# Patient Record
Sex: FEMALE | Race: WHITE | Employment: UNEMPLOYED | URBAN - METROPOLITAN AREA
[De-identification: names, ages, dates, MRNs, and addresses within clinical notes are randomized per-mention and may not be internally consistent; named-entity substitution may affect disease eponyms.]

---

## 2018-03-12 ENCOUNTER — TELEPHONE (OUTPATIENT)
Dept: ENDOCRINOLOGY | Age: 58
End: 2018-03-12

## 2018-03-12 DIAGNOSIS — D35.2 PITUITARY ADENOMA (HCC): Primary | ICD-10-CM

## 2018-03-12 DIAGNOSIS — E11.9 TYPE 2 DIABETES MELLITUS WITHOUT COMPLICATION, WITHOUT LONG-TERM CURRENT USE OF INSULIN (HCC): ICD-10-CM

## 2018-04-02 DIAGNOSIS — D35.2 PITUITARY ADENOMA (HCC): ICD-10-CM

## 2018-04-02 DIAGNOSIS — E11.9 TYPE 2 DIABETES MELLITUS WITHOUT COMPLICATION, WITHOUT LONG-TERM CURRENT USE OF INSULIN (HCC): ICD-10-CM

## 2018-04-02 LAB
A/G RATIO: 1.7 (ref 1.1–2.2)
ALBUMIN SERPL-MCNC: 4.4 G/DL (ref 3.4–5)
ALP BLD-CCNC: 63 U/L (ref 40–129)
ALT SERPL-CCNC: 59 U/L (ref 10–40)
ANION GAP SERPL CALCULATED.3IONS-SCNC: 15 MMOL/L (ref 3–16)
AST SERPL-CCNC: 45 U/L (ref 15–37)
BILIRUB SERPL-MCNC: 0.4 MG/DL (ref 0–1)
BUN BLDV-MCNC: 11 MG/DL (ref 7–20)
CALCIUM SERPL-MCNC: 9.1 MG/DL (ref 8.3–10.6)
CHLORIDE BLD-SCNC: 99 MMOL/L (ref 99–110)
CHOLESTEROL, TOTAL: 158 MG/DL (ref 0–199)
CO2: 26 MMOL/L (ref 21–32)
CREAT SERPL-MCNC: 0.6 MG/DL (ref 0.6–1.1)
CREATININE URINE: 71.1 MG/DL (ref 28–259)
GFR AFRICAN AMERICAN: >60
GFR NON-AFRICAN AMERICAN: >60
GLOBULIN: 2.6 G/DL
GLUCOSE BLD-MCNC: 124 MG/DL (ref 70–99)
HDLC SERPL-MCNC: 40 MG/DL (ref 40–60)
LDL CHOLESTEROL CALCULATED: 79 MG/DL
MICROALBUMIN UR-MCNC: <1.2 MG/DL
MICROALBUMIN/CREAT UR-RTO: NORMAL MG/G (ref 0–30)
POTASSIUM SERPL-SCNC: 4.6 MMOL/L (ref 3.5–5.1)
PROLACTIN: 26.8 NG/ML
SODIUM BLD-SCNC: 140 MMOL/L (ref 136–145)
TOTAL PROTEIN: 7 G/DL (ref 6.4–8.2)
TRIGL SERPL-MCNC: 195 MG/DL (ref 0–150)
VLDLC SERPL CALC-MCNC: 39 MG/DL

## 2018-04-03 LAB
ESTIMATED AVERAGE GLUCOSE: 125.5 MG/DL
HBA1C MFR BLD: 6 %
TSH, 3RD GENERATION: 3.71 MU/L (ref 0.3–4)

## 2018-04-05 LAB
VITAMIN D2 AND D3, TOTAL: 42.3 NG/ML (ref 30–80)
VITAMIN D2, 25 HYDROXY: <1 NG/ML
VITAMIN D3,25 HYDROXY: 42.3 NG/ML

## 2018-04-11 ENCOUNTER — OFFICE VISIT (OUTPATIENT)
Dept: ENDOCRINOLOGY | Age: 58
End: 2018-04-11

## 2018-04-11 VITALS
HEART RATE: 68 BPM | BODY MASS INDEX: 41.65 KG/M2 | WEIGHT: 265.4 LBS | OXYGEN SATURATION: 99 % | SYSTOLIC BLOOD PRESSURE: 130 MMHG | DIASTOLIC BLOOD PRESSURE: 84 MMHG | HEIGHT: 67 IN

## 2018-04-11 DIAGNOSIS — E22.1 HYPERPROLACTINEMIA (HCC): Primary | ICD-10-CM

## 2018-04-11 PROCEDURE — 99215 OFFICE O/P EST HI 40 MIN: CPT | Performed by: INTERNAL MEDICINE

## 2018-04-11 RX ORDER — CABERGOLINE 0.5 MG/1
0.25 TABLET ORAL
Qty: 8 TABLET | Refills: 3 | Status: SHIPPED | OUTPATIENT
Start: 2018-04-12 | End: 2018-05-07 | Stop reason: SDUPTHER

## 2018-04-11 RX ORDER — CHOLECALCIFEROL (VITAMIN D3) 125 MCG
1 CAPSULE ORAL 2 TIMES DAILY
COMMUNITY

## 2018-04-11 RX ORDER — BACLOFEN 10 MG/1
10 TABLET ORAL 4 TIMES DAILY
COMMUNITY

## 2018-04-11 ASSESSMENT — PATIENT HEALTH QUESTIONNAIRE - PHQ9
SUM OF ALL RESPONSES TO PHQ9 QUESTIONS 1 & 2: 0
2. FEELING DOWN, DEPRESSED OR HOPELESS: 0
1. LITTLE INTEREST OR PLEASURE IN DOING THINGS: 0
SUM OF ALL RESPONSES TO PHQ QUESTIONS 1-9: 0

## 2018-05-07 ENCOUNTER — TELEPHONE (OUTPATIENT)
Dept: ENDOCRINOLOGY | Age: 58
End: 2018-05-07

## 2018-05-07 DIAGNOSIS — E22.1 HYPERPROLACTINEMIA (HCC): ICD-10-CM

## 2018-05-07 RX ORDER — CABERGOLINE 0.5 MG/1
0.5 TABLET ORAL
Qty: 8 TABLET | Refills: 3 | Status: SHIPPED | OUTPATIENT
Start: 2018-05-07 | End: 2018-09-23 | Stop reason: SDUPTHER

## 2018-07-25 DIAGNOSIS — E22.1 HYPERPROLACTINEMIA (HCC): ICD-10-CM

## 2018-07-25 LAB
ESTRADIOL LEVEL: <5 PG/ML
FOLLICLE STIMULATING HORMONE: 48.3 MIU/ML
LUTEINIZING HORMONE: 36.6 MIU/ML
PROLACTIN: 20 NG/ML
T3 TOTAL: 1.32 NG/ML (ref 0.8–2)
T4 FREE: 1.2 NG/DL (ref 0.9–1.8)
TSH SERPL DL<=0.05 MIU/L-ACNC: 4.36 UIU/ML (ref 0.27–4.2)

## 2018-09-23 DIAGNOSIS — E22.1 HYPERPROLACTINEMIA (HCC): ICD-10-CM

## 2018-09-24 RX ORDER — CABERGOLINE 0.5 MG/1
TABLET ORAL
Qty: 8 TABLET | Refills: 2 | Status: SHIPPED | OUTPATIENT
Start: 2018-09-24 | End: 2019-01-14 | Stop reason: SDUPTHER

## 2018-10-01 ENCOUNTER — TELEPHONE (OUTPATIENT)
Dept: ENDOCRINOLOGY | Age: 58
End: 2018-10-01

## 2018-10-01 DIAGNOSIS — D35.2 PITUITARY ADENOMA (HCC): ICD-10-CM

## 2018-10-01 NOTE — TELEPHONE ENCOUNTER
Prolactin is only lab order in chart, pt would like to know if you want any additional labs done, she said usually she gets an A1C also. She continues to have heavy bleeding @ 63, wants to know if you want to check estrogen or hormones.  She plans to go to [facility] [address]

## 2018-10-02 DIAGNOSIS — E22.1 HYPERPROLACTINEMIA (HCC): ICD-10-CM

## 2018-10-02 DIAGNOSIS — D35.2 PITUITARY ADENOMA (HCC): ICD-10-CM

## 2018-10-02 LAB
CREATININE URINE: 154.6 MG/DL (ref 28–259)
ESTRADIOL LEVEL: 64 PG/ML
FOLLICLE STIMULATING HORMONE: 6.7 MIU/ML
LUTEINIZING HORMONE: 5.4 MIU/ML
MICROALBUMIN UR-MCNC: <1.2 MG/DL
MICROALBUMIN/CREAT UR-RTO: NORMAL MG/G (ref 0–30)
PROLACTIN: 26.3 NG/ML
TSH SERPL DL<=0.05 MIU/L-ACNC: 3.08 UIU/ML (ref 0.27–4.2)

## 2018-10-03 ENCOUNTER — TELEPHONE (OUTPATIENT)
Dept: ENDOCRINOLOGY | Age: 58
End: 2018-10-03

## 2018-10-03 LAB
ESTIMATED AVERAGE GLUCOSE: 119.8 MG/DL
HBA1C MFR BLD: 5.8 %

## 2018-10-11 ENCOUNTER — OFFICE VISIT (OUTPATIENT)
Dept: ENDOCRINOLOGY | Age: 58
End: 2018-10-11
Payer: COMMERCIAL

## 2018-10-11 VITALS
OXYGEN SATURATION: 98 % | DIASTOLIC BLOOD PRESSURE: 72 MMHG | BODY MASS INDEX: 40.34 KG/M2 | SYSTOLIC BLOOD PRESSURE: 118 MMHG | HEIGHT: 67 IN | WEIGHT: 257 LBS | HEART RATE: 64 BPM

## 2018-10-11 DIAGNOSIS — D35.2 PITUITARY MICROADENOMA (HCC): Primary | ICD-10-CM

## 2018-10-11 DIAGNOSIS — R73.03 PREDIABETES: ICD-10-CM

## 2018-10-11 DIAGNOSIS — E66.9 OBESITY (BMI 35.0-39.9 WITHOUT COMORBIDITY): ICD-10-CM

## 2018-10-11 DIAGNOSIS — E78.2 MIXED HYPERLIPIDEMIA: ICD-10-CM

## 2018-10-11 PROCEDURE — 99214 OFFICE O/P EST MOD 30 MIN: CPT | Performed by: INTERNAL MEDICINE

## 2018-10-11 RX ORDER — IBUPROFEN 600 MG/1
600 TABLET ORAL EVERY 6 HOURS PRN
COMMUNITY
End: 2019-04-11
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
## 2019-01-14 DIAGNOSIS — E22.1 HYPERPROLACTINEMIA (HCC): ICD-10-CM

## 2019-01-14 RX ORDER — CABERGOLINE 0.5 MG/1
TABLET ORAL
Qty: 8 TABLET | Refills: 1 | Status: SHIPPED | OUTPATIENT
Start: 2019-01-14 | End: 2019-03-21 | Stop reason: SDUPTHER

## 2019-03-21 DIAGNOSIS — E22.1 HYPERPROLACTINEMIA (HCC): ICD-10-CM

## 2019-03-22 RX ORDER — CABERGOLINE 0.5 MG/1
TABLET ORAL
Qty: 8 TABLET | Refills: 0 | Status: SHIPPED | OUTPATIENT
Start: 2019-03-22 | End: 2019-04-11 | Stop reason: SDUPTHER

## 2019-04-08 ENCOUNTER — TELEPHONE (OUTPATIENT)
Dept: ENDOCRINOLOGY | Age: 59
End: 2019-04-08

## 2019-04-08 NOTE — TELEPHONE ENCOUNTER
Please advise patient that I have ordered the labs that would be done after her hysterectomy already

## 2019-04-08 NOTE — TELEPHONE ENCOUNTER
Pt called coming in on Thurs for an appt and wants to know if Dr River Ramirez should order additional labs since she had a hysterectomy and salpingo-oophorectomy?  She had the surgery back in Oct 2018

## 2019-04-10 DIAGNOSIS — D35.2 PITUITARY MICROADENOMA (HCC): ICD-10-CM

## 2019-04-10 DIAGNOSIS — E78.2 MIXED HYPERLIPIDEMIA: ICD-10-CM

## 2019-04-10 DIAGNOSIS — E66.9 OBESITY (BMI 35.0-39.9 WITHOUT COMORBIDITY): ICD-10-CM

## 2019-04-10 DIAGNOSIS — R73.03 PREDIABETES: ICD-10-CM

## 2019-04-10 LAB
A/G RATIO: 1.5 (ref 1.1–2.2)
ALBUMIN SERPL-MCNC: 4.7 G/DL (ref 3.4–5)
ALP BLD-CCNC: 92 U/L (ref 40–129)
ALT SERPL-CCNC: 43 U/L (ref 10–40)
ANION GAP SERPL CALCULATED.3IONS-SCNC: 18 MMOL/L (ref 3–16)
AST SERPL-CCNC: 41 U/L (ref 15–37)
BILIRUB SERPL-MCNC: 0.7 MG/DL (ref 0–1)
BUN BLDV-MCNC: 11 MG/DL (ref 7–20)
CALCIUM SERPL-MCNC: 9.6 MG/DL (ref 8.3–10.6)
CHLORIDE BLD-SCNC: 101 MMOL/L (ref 99–110)
CHOLESTEROL, TOTAL: 167 MG/DL (ref 0–199)
CO2: 24 MMOL/L (ref 21–32)
CREAT SERPL-MCNC: 0.6 MG/DL (ref 0.6–1.1)
ESTIMATED AVERAGE GLUCOSE: 137 MG/DL
FOLLICLE STIMULATING HORMONE: 69.1 MIU/ML
GFR AFRICAN AMERICAN: >60
GFR NON-AFRICAN AMERICAN: >60
GLOBULIN: 3.1 G/DL
GLUCOSE BLD-MCNC: 122 MG/DL (ref 70–99)
HBA1C MFR BLD: 6.4 %
HDLC SERPL-MCNC: 44 MG/DL (ref 40–60)
LDL CHOLESTEROL CALCULATED: 88 MG/DL
LUTEINIZING HORMONE: 52.5 MIU/ML
POTASSIUM SERPL-SCNC: 4.3 MMOL/L (ref 3.5–5.1)
PROLACTIN: 13.9 NG/ML
SODIUM BLD-SCNC: 143 MMOL/L (ref 136–145)
T3 TOTAL: 1.54 NG/ML (ref 0.8–2)
T4 FREE: 1.1 NG/DL (ref 0.9–1.8)
TOTAL PROTEIN: 7.8 G/DL (ref 6.4–8.2)
TRIGL SERPL-MCNC: 173 MG/DL (ref 0–150)
TSH SERPL DL<=0.05 MIU/L-ACNC: 3.31 UIU/ML (ref 0.27–4.2)
VITAMIN D 25-HYDROXY: 49.7 NG/ML
VLDLC SERPL CALC-MCNC: 35 MG/DL

## 2019-04-11 ENCOUNTER — OFFICE VISIT (OUTPATIENT)
Dept: ENDOCRINOLOGY | Age: 59
End: 2019-04-11
Payer: COMMERCIAL

## 2019-04-11 VITALS
WEIGHT: 252 LBS | SYSTOLIC BLOOD PRESSURE: 132 MMHG | HEART RATE: 91 BPM | HEIGHT: 67 IN | DIASTOLIC BLOOD PRESSURE: 70 MMHG | OXYGEN SATURATION: 98 % | BODY MASS INDEX: 39.55 KG/M2

## 2019-04-11 DIAGNOSIS — E11.9 TYPE 2 DIABETES MELLITUS WITHOUT COMPLICATION, WITHOUT LONG-TERM CURRENT USE OF INSULIN (HCC): ICD-10-CM

## 2019-04-11 DIAGNOSIS — D35.2 PITUITARY MICROADENOMA (HCC): ICD-10-CM

## 2019-04-11 DIAGNOSIS — E22.1 HYPERPROLACTINEMIA (HCC): Primary | ICD-10-CM

## 2019-04-11 PROCEDURE — 99214 OFFICE O/P EST MOD 30 MIN: CPT | Performed by: INTERNAL MEDICINE

## 2019-04-11 RX ORDER — CABERGOLINE 0.5 MG/1
TABLET ORAL
Qty: 8 TABLET | Refills: 5 | Status: SHIPPED | OUTPATIENT
Start: 2019-04-11 | End: 2019-07-10 | Stop reason: SDUPTHER
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
## 2019-07-05 ENCOUNTER — TELEPHONE (OUTPATIENT)
Dept: ENDOCRINOLOGY | Age: 59
End: 2019-07-05

## 2019-07-05 DIAGNOSIS — E11.9 TYPE 2 DIABETES MELLITUS WITHOUT COMPLICATION, WITHOUT LONG-TERM CURRENT USE OF INSULIN (HCC): ICD-10-CM

## 2019-07-05 DIAGNOSIS — M81.0 OSTEOPOROSIS, UNSPECIFIED OSTEOPOROSIS TYPE, UNSPECIFIED PATHOLOGICAL FRACTURE PRESENCE: Primary | ICD-10-CM

## 2019-07-05 DIAGNOSIS — E22.1 HYPERPROLACTINEMIA (HCC): ICD-10-CM

## 2019-07-05 DIAGNOSIS — D35.2 PITUITARY MICROADENOMA (HCC): ICD-10-CM

## 2019-07-05 LAB
CREATININE URINE: 68.4 MG/DL (ref 28–259)
MICROALBUMIN UR-MCNC: <1.2 MG/DL
MICROALBUMIN/CREAT UR-RTO: NORMAL MG/G (ref 0–30)

## 2019-07-05 NOTE — TELEPHONE ENCOUNTER
PT states she is to have a Dexa Scan but [facility] states that her order is not in the system, they need the order faxed to [phone]

## 2019-07-06 LAB
A/G RATIO: 1.9 (ref 1.1–2.2)
ALBUMIN SERPL-MCNC: 4.8 G/DL (ref 3.4–5)
ALP BLD-CCNC: 81 U/L (ref 40–129)
ALT SERPL-CCNC: 39 U/L (ref 10–40)
ANION GAP SERPL CALCULATED.3IONS-SCNC: 12 MMOL/L (ref 3–16)
AST SERPL-CCNC: 36 U/L (ref 15–37)
BILIRUB SERPL-MCNC: 0.5 MG/DL (ref 0–1)
BUN BLDV-MCNC: 12 MG/DL (ref 7–20)
CALCIUM SERPL-MCNC: 9.8 MG/DL (ref 8.3–10.6)
CHLORIDE BLD-SCNC: 101 MMOL/L (ref 99–110)
CHOLESTEROL, TOTAL: 157 MG/DL (ref 0–199)
CO2: 28 MMOL/L (ref 21–32)
CREAT SERPL-MCNC: 0.6 MG/DL (ref 0.6–1.1)
ESTIMATED AVERAGE GLUCOSE: 128.4 MG/DL
ESTRADIOL LEVEL: <5 PG/ML
FOLLICLE STIMULATING HORMONE: 61.9 MIU/ML
GFR AFRICAN AMERICAN: >60
GFR NON-AFRICAN AMERICAN: >60
GLOBULIN: 2.5 G/DL
GLUCOSE BLD-MCNC: 111 MG/DL (ref 70–99)
HBA1C MFR BLD: 6.1 %
HDLC SERPL-MCNC: 41 MG/DL (ref 40–60)
LDL CHOLESTEROL CALCULATED: 78 MG/DL
PARATHYROID HORMONE INTACT: 22.2 PG/ML (ref 14–72)
POTASSIUM SERPL-SCNC: 4.4 MMOL/L (ref 3.5–5.1)
PROLACTIN: 13.8 NG/ML
SODIUM BLD-SCNC: 141 MMOL/L (ref 136–145)
T4 FREE: 1.2 NG/DL (ref 0.9–1.8)
TOTAL PROTEIN: 7.3 G/DL (ref 6.4–8.2)
TRIGL SERPL-MCNC: 190 MG/DL (ref 0–150)
TSH SERPL DL<=0.05 MIU/L-ACNC: 3.17 UIU/ML (ref 0.27–4.2)
VLDLC SERPL CALC-MCNC: 38 MG/DL

## 2019-07-07 LAB
T3 TOTAL: 1.32 NG/ML (ref 0.8–2)
VITAMIN D 25-HYDROXY: 49.3 NG/ML

## 2019-07-08 ENCOUNTER — HOSPITAL ENCOUNTER (OUTPATIENT)
Dept: GENERAL RADIOLOGY | Age: 59
Discharge: HOME | End: 2019-07-08
Payer: COMMERCIAL

## 2019-07-08 DIAGNOSIS — M81.0 OSTEOPOROSIS, UNSPECIFIED OSTEOPOROSIS TYPE, UNSPECIFIED PATHOLOGICAL FRACTURE PRESENCE: ICD-10-CM

## 2019-07-08 PROCEDURE — 77080 DXA BONE DENSITY AXIAL: CPT

## 2019-07-10 ENCOUNTER — OFFICE VISIT (OUTPATIENT)
Dept: ENDOCRINOLOGY | Age: 59
End: 2019-07-10
Payer: COMMERCIAL

## 2019-07-10 VITALS
DIASTOLIC BLOOD PRESSURE: 72 MMHG | HEIGHT: 67 IN | HEART RATE: 78 BPM | OXYGEN SATURATION: 98 % | SYSTOLIC BLOOD PRESSURE: 130 MMHG | WEIGHT: 251 LBS | BODY MASS INDEX: 39.39 KG/M2

## 2019-07-10 DIAGNOSIS — E22.1 HYPERPROLACTINEMIA (HCC): Primary | ICD-10-CM

## 2019-07-10 DIAGNOSIS — J30.1 SEASONAL ALLERGIC RHINITIS DUE TO POLLEN: ICD-10-CM

## 2019-07-10 PROCEDURE — 99214 OFFICE O/P EST MOD 30 MIN: CPT | Performed by: INTERNAL MEDICINE

## 2019-07-10 RX ORDER — CABERGOLINE 0.5 MG/1
TABLET ORAL
Qty: 8 TABLET | Refills: 5 | Status: SHIPPED | OUTPATIENT
Start: 2019-07-10 | End: 2020-05-06 | Stop reason: SDUPTHER

## 2019-07-10 NOTE — PROGRESS NOTES
abdomen    Cyst of fallopian tube 2018    bilat     H/O bronchitis     Pituitary microadenoma (HCC)     Pituitary microadenoma ([address] 10/11/2018    Prediabetes     Sleep apnea     PT DOES NOT USE HER CPAP MACHINE    Torn meniscus 2018    L      Patient Active Problem List    Diagnosis Date Noted    Pituitary microadenoma ([address] 10/11/2018    Left breast mass 10/21/2015    Hearing loss 05/07/2014    Allergic rhinitis, seasonal 05/07/2014    Sialadenitis 12/10/2013    Low back pain 04/29/2013    Neck pain 04/29/2013     Past Surgical History:   Procedure Laterality Date    BLADDER SURGERY      bladder sling    BREAST BIOPSY Left 10/21/2015    LEFT BREAST BIOPSY                        COLONOSCOPY      DILATION AND CURETTAGE OF UTERUS      HIATAL HERNIA REPAIR  1997    HYSTERECTOMY  10/26/2018    KNEE CARTILAGE SURGERY  2008    KNEE SURGERY Left 12/17/2018    MOUTH SURGERY  2017    skin cancer lip     RHINOPLASTY  1981    UTERINE FIBROID SURGERY       Family History   Problem Relation Age of Onset    Diabetes Father         pre    Cancer Father     Emphysema Father     Stroke Father 48    Heart Disease Mother     Hypertension Mother     Cancer Other     Heart Disease Other     Diabetes Other     High Blood Pressure Other     Stroke Other     Other Sister         hypothyroid     Other Brother         colon polyps    Diabetes Maternal Aunt         pre    Stroke Maternal Grandmother     Stroke Maternal Grandfather     Emphysema Paternal Grandmother      Social History     Socioeconomic History    Marital status:      Spouse name: None    Number of children: 2    Years of education: None    Highest education level: None   Occupational History    None   Social Needs    Financial resource strain: None    Food insecurity:     Worry: None     Inability: None    Transportation needs:     Medical: None     Non-medical: None   Tobacco Use    Smoking status: Former Smoker   

## 2019-09-03 ENCOUNTER — OFFICE VISIT (OUTPATIENT)
Dept: ENT CLINIC | Age: 59
End: 2019-09-03
Payer: COMMERCIAL

## 2019-09-03 VITALS — DIASTOLIC BLOOD PRESSURE: 84 MMHG | OXYGEN SATURATION: 96 % | SYSTOLIC BLOOD PRESSURE: 138 MMHG | HEART RATE: 90 BPM

## 2019-09-03 DIAGNOSIS — R05.3 PERSISTENT COUGH FOR 3 WEEKS OR LONGER: Primary | ICD-10-CM

## 2019-09-03 PROCEDURE — 99203 OFFICE O/P NEW LOW 30 MIN: CPT | Performed by: OTOLARYNGOLOGY

## 2019-09-10 ENCOUNTER — HOSPITAL ENCOUNTER (OUTPATIENT)
Dept: GENERAL RADIOLOGY | Age: 59
Discharge: HOME | End: 2019-09-10
Payer: COMMERCIAL

## 2019-09-10 DIAGNOSIS — R05.3 PERSISTENT COUGH FOR 3 WEEKS OR LONGER: ICD-10-CM

## 2019-09-10 PROCEDURE — 74220 X-RAY XM ESOPHAGUS 1CNTRST: CPT

## 2019-09-24 ENCOUNTER — TELEPHONE (OUTPATIENT)
Dept: ENT CLINIC | Age: 59
End: 2019-09-24

## 2019-09-24 NOTE — TELEPHONE ENCOUNTER
Spoke with patient regarding her swallow study  At present there is no radiographic evidence of reflux or hiatal hernia  There has been no further coughing since she instituted her omeprazole a few weeks ago  There is no dyspepsia  I reviewed her pulmonary function testing as well  At this point she may return as needed

## 2019-11-05 DIAGNOSIS — J30.1 SEASONAL ALLERGIC RHINITIS DUE TO POLLEN: ICD-10-CM

## 2019-11-05 DIAGNOSIS — E22.1 HYPERPROLACTINEMIA (HCC): ICD-10-CM

## 2019-11-05 LAB
A/G RATIO: 1.4 (ref 1.1–2.2)
ALBUMIN SERPL-MCNC: 4.3 G/DL (ref 3.4–5)
ALP BLD-CCNC: 90 U/L (ref 40–129)
ALT SERPL-CCNC: 85 U/L (ref 10–40)
ANION GAP SERPL CALCULATED.3IONS-SCNC: 17 MMOL/L (ref 3–16)
AST SERPL-CCNC: 86 U/L (ref 15–37)
BILIRUB SERPL-MCNC: 0.6 MG/DL (ref 0–1)
BUN BLDV-MCNC: 11 MG/DL (ref 7–20)
CALCIUM SERPL-MCNC: 9.7 MG/DL (ref 8.3–10.6)
CHLORIDE BLD-SCNC: 100 MMOL/L (ref 99–110)
CHOLESTEROL, TOTAL: 173 MG/DL (ref 0–199)
CO2: 25 MMOL/L (ref 21–32)
CREAT SERPL-MCNC: 0.6 MG/DL (ref 0.6–1.1)
GFR AFRICAN AMERICAN: >60
GFR NON-AFRICAN AMERICAN: >60
GLOBULIN: 3 G/DL
GLUCOSE BLD-MCNC: 115 MG/DL (ref 70–99)
HDLC SERPL-MCNC: 49 MG/DL (ref 40–60)
LDL CHOLESTEROL CALCULATED: 90 MG/DL
POTASSIUM SERPL-SCNC: 4.4 MMOL/L (ref 3.5–5.1)
PROLACTIN: 16 NG/ML
SODIUM BLD-SCNC: 142 MMOL/L (ref 136–145)
TOTAL PROTEIN: 7.3 G/DL (ref 6.4–8.2)
TRIGL SERPL-MCNC: 170 MG/DL (ref 0–150)
TSH SERPL DL<=0.05 MIU/L-ACNC: 2.6 UIU/ML (ref 0.27–4.2)
VITAMIN D 25-HYDROXY: 50.6 NG/ML
VLDLC SERPL CALC-MCNC: 34 MG/DL

## 2019-11-06 LAB
ESTIMATED AVERAGE GLUCOSE: 128.4 MG/DL
HBA1C MFR BLD: 6.1 %

## 2019-11-11 ENCOUNTER — OFFICE VISIT (OUTPATIENT)
Dept: ENDOCRINOLOGY | Age: 59
End: 2019-11-11
Payer: COMMERCIAL

## 2019-11-11 VITALS
HEIGHT: 67 IN | BODY MASS INDEX: 39.87 KG/M2 | SYSTOLIC BLOOD PRESSURE: 116 MMHG | WEIGHT: 254 LBS | OXYGEN SATURATION: 98 % | HEART RATE: 65 BPM | DIASTOLIC BLOOD PRESSURE: 82 MMHG

## 2019-11-11 DIAGNOSIS — E11.9 TYPE 2 DIABETES MELLITUS WITHOUT COMPLICATION, WITHOUT LONG-TERM CURRENT USE OF INSULIN (HCC): ICD-10-CM

## 2019-11-11 DIAGNOSIS — R73.03 PREDIABETES: ICD-10-CM

## 2019-11-11 DIAGNOSIS — D35.2 PITUITARY MICROADENOMA (HCC): Primary | ICD-10-CM

## 2019-11-11 DIAGNOSIS — E66.9 OBESITY (BMI 35.0-39.9 WITHOUT COMORBIDITY): ICD-10-CM

## 2019-11-11 PROCEDURE — 99215 OFFICE O/P EST HI 40 MIN: CPT | Performed by: INTERNAL MEDICINE

## 2019-11-11 RX ORDER — ICOSAPENT ETHYL 1000 MG/1
2 CAPSULE ORAL 2 TIMES DAILY
Qty: 360 CAPSULE | Refills: 3 | Status: SHIPPED | OUTPATIENT
Start: 2019-11-11 | End: 2020-08-31

## 2019-11-12 ENCOUNTER — TELEPHONE (OUTPATIENT)
Dept: ENDOCRINOLOGY | Age: 59
End: 2019-11-12

## 2019-11-13 ENCOUNTER — TELEPHONE (OUTPATIENT)
Dept: ENDOCRINOLOGY | Age: 59
End: 2019-11-13

## 2020-02-13 ENCOUNTER — TELEPHONE (OUTPATIENT)
Dept: ENDOCRINOLOGY | Age: 60
End: 2020-02-13

## 2020-02-13 NOTE — TELEPHONE ENCOUNTER
Please advise patient that her insurance is not covering vascepa --I would recommend that she continue with over-the-counter omega-3 fatty acids

## 2020-02-13 NOTE — TELEPHONE ENCOUNTER
I had Tomasa resubmit this medication still requires a PA, if you would like to proceed with a PA please let me know why this medication is indicated for the pt and I will type of a letter to appeal. Or I can have the pt call insurance to find out what else is covered.

## 2020-02-18 NOTE — TELEPHONE ENCOUNTER
Mrs. Kiley Yu informed Emmanuel Aguilar is not covered by her plan. Explained per Dr. Deshawn Butts to take an OTC  omega-3 fatty acids. Mrs. Cao Gigi expressed understanding and had no further questions.

## 2020-02-21 DIAGNOSIS — E66.9 OBESITY (BMI 35.0-39.9 WITHOUT COMORBIDITY): ICD-10-CM

## 2020-02-21 DIAGNOSIS — R73.03 PREDIABETES: ICD-10-CM

## 2020-02-21 DIAGNOSIS — D35.2 PITUITARY MICROADENOMA (HCC): ICD-10-CM

## 2020-02-21 LAB
A/G RATIO: 1.5 (ref 1.1–2.2)
ALBUMIN SERPL-MCNC: 4.6 G/DL (ref 3.4–5)
ALP BLD-CCNC: 83 U/L (ref 40–129)
ALT SERPL-CCNC: 96 U/L (ref 10–40)
ANION GAP SERPL CALCULATED.3IONS-SCNC: 13 MMOL/L (ref 3–16)
AST SERPL-CCNC: 90 U/L (ref 15–37)
BILIRUB SERPL-MCNC: 0.6 MG/DL (ref 0–1)
BUN BLDV-MCNC: 11 MG/DL (ref 7–20)
CALCIUM SERPL-MCNC: 9.8 MG/DL (ref 8.3–10.6)
CHLORIDE BLD-SCNC: 98 MMOL/L (ref 99–110)
CHOLESTEROL, TOTAL: 167 MG/DL (ref 0–199)
CO2: 28 MMOL/L (ref 21–32)
CREAT SERPL-MCNC: 0.6 MG/DL (ref 0.6–1.1)
GFR AFRICAN AMERICAN: >60
GFR NON-AFRICAN AMERICAN: >60
GLOBULIN: 3 G/DL
GLUCOSE BLD-MCNC: 117 MG/DL (ref 70–99)
HDLC SERPL-MCNC: 40 MG/DL (ref 40–60)
LDL CHOLESTEROL CALCULATED: 92 MG/DL
POTASSIUM SERPL-SCNC: 4.7 MMOL/L (ref 3.5–5.1)
SODIUM BLD-SCNC: 139 MMOL/L (ref 136–145)
TOTAL PROTEIN: 7.6 G/DL (ref 6.4–8.2)
TRIGL SERPL-MCNC: 173 MG/DL (ref 0–150)
TSH SERPL DL<=0.05 MIU/L-ACNC: 3.01 UIU/ML (ref 0.27–4.2)
VITAMIN D 25-HYDROXY: 58 NG/ML
VLDLC SERPL CALC-MCNC: 35 MG/DL

## 2020-02-22 LAB
ESTIMATED AVERAGE GLUCOSE: 122.6 MG/DL
HBA1C MFR BLD: 5.9 %

## 2020-02-24 ENCOUNTER — OFFICE VISIT (OUTPATIENT)
Dept: ENDOCRINOLOGY | Age: 60
End: 2020-02-24
Payer: COMMERCIAL

## 2020-02-24 VITALS
HEIGHT: 67 IN | HEART RATE: 91 BPM | BODY MASS INDEX: 41.28 KG/M2 | SYSTOLIC BLOOD PRESSURE: 110 MMHG | OXYGEN SATURATION: 97 % | DIASTOLIC BLOOD PRESSURE: 72 MMHG | WEIGHT: 263 LBS | RESPIRATION RATE: 14 BRPM

## 2020-02-24 PROBLEM — Z78.0 POSTMENOPAUSAL: Status: ACTIVE | Noted: 2020-02-24

## 2020-02-24 PROBLEM — E66.9 OBESITY (BMI 35.0-39.9 WITHOUT COMORBIDITY): Status: ACTIVE | Noted: 2020-02-24

## 2020-02-24 PROBLEM — E78.2 MIXED HYPERLIPIDEMIA: Status: ACTIVE | Noted: 2020-02-24

## 2020-02-24 PROCEDURE — 99214 OFFICE O/P EST MOD 30 MIN: CPT | Performed by: INTERNAL MEDICINE

## 2020-02-24 NOTE — PROGRESS NOTES
Mixed hyperlipidemia 02/24/2020    Postmenopausal 02/24/2020    Pituitary microadenoma ([address] 10/11/2018    Left breast mass 10/21/2015    Hearing loss 05/07/2014    Allergic rhinitis, seasonal 05/07/2014    Sialadenitis 12/10/2013    Low back pain 04/29/2013    Neck pain 04/29/2013     Past Surgical History:   Procedure Laterality Date    BLADDER SURGERY      bladder sling    BREAST BIOPSY Left 10/21/2015    LEFT BREAST BIOPSY                        COLONOSCOPY      DILATION AND CURETTAGE OF UTERUS      HIATAL HERNIA REPAIR  1997    HYSTERECTOMY  10/26/2018    KNEE CARTILAGE SURGERY  2008    KNEE SURGERY Left 12/17/2018    MOUTH SURGERY  2017    skin cancer lip     RHINOPLASTY  1981    UTERINE FIBROID SURGERY       Family History   Problem Relation Age of Onset    Diabetes Father         pre    Cancer Father     Emphysema Father     Stroke Father 48    Heart Disease Mother     Hypertension Mother     Cancer Other     Heart Disease Other     Diabetes Other     High Blood Pressure Other     Stroke Other     Other Sister         hypothyroid     Other Brother         colon polyps    Diabetes Maternal Aunt         pre    Stroke Maternal Grandmother     Stroke Maternal Grandfather     Emphysema Paternal Grandmother      Social History     Socioeconomic History    Marital status:      Spouse name: None    Number of children: 2    Years of education: None    Highest education level: None   Occupational History    None   Social Needs    Financial resource strain: None    Food insecurity:     Worry: None     Inability: None    Transportation needs:     Medical: None     Non-medical: None   Tobacco Use    Smoking status: Former Smoker    Smokeless tobacco: Never Used   Substance and Sexual Activity    Alcohol use: Yes     Comment: rarely    Drug use: No    Sexual activity: None   Lifestyle    Physical activity:     Days per week: None     Minutes per session: None    Stress: None   Relationships    Social connections:     Talks on phone: None     Gets together: None     Attends Voodoo service: None     Active member of club or organization: None     Attends meetings of clubs or organizations: None     Relationship status: None    Intimate partner violence:     Fear of current or ex partner: None     Emotionally abused: None     Physically abused: None     Forced sexual activity: None   Other Topics Concern    None   Social History Narrative    None     Current Outpatient Medications   Medication Sig Dispense Refill    OMEPRAZOLE PO Take by mouth      Icosapent Ethyl (VASCEPA) 1 g CAPS capsule Take 2 capsules by mouth 2 times daily 360 capsule 3    Fluticasone Propionate (FLONASE NA) by Nasal route      cabergoline (DOSTINEX) 0.5 MG tablet TAKE 1 TABLET BY MOUTH TWICE WEEKLY 8 tablet 5    baclofen (LIORESAL) 10 MG tablet Take 10 mg by mouth 4 times daily Indications: 15 mg AM, 10mg afternoon, 15mg PM       CHOLINE PO Take 1 tablet by mouth daily      Cholecalciferol (VITAMIN D3) 2000 units TABS Take 1 tablet by mouth 2 times daily       Loratadine (CLARITIN) 10 MG CAPS Take by mouth      multivitamin (THERAGRAN) per tablet Take 1 tablet by mouth daily. No current facility-administered medications for this visit. Allergies   Allergen Reactions    Adhesive Tape      Steri strips     Biaxin [Clarithromycin]      Cannot take     Chlorhexidine     Penicillins Hives    Sulfa Antibiotics Itching and Other (See Comments)     Look like bad sun burn    Iodine Rash     BLISTERS  BLISTERS           Review of Systems:  I have reviewed the review of system questionnaire filled by the patient .   Patient was advised to contact PCP for non endocrine signs and symptoms     OBJECTIVE:   /72   Pulse 91   Resp 14   Ht 5' 6.5\" (1.689 m)   Wt 263 lb (119.3 kg)   LMP 10/10/2018   SpO2 97%   BMI 41.81 kg/m²   Wt Readings from Last 3 Encounters:   02/24/20 263 providing counseling and coordinating care of patient's multiple health issues. Return in about 3 months (around 5/24/2020).

## 2020-05-06 RX ORDER — CABERGOLINE 0.5 MG/1
TABLET ORAL
Qty: 8 TABLET | Refills: 5 | Status: SHIPPED | OUTPATIENT
Start: 2020-05-06 | End: 2021-01-07 | Stop reason: SDUPTHER

## 2020-08-27 DIAGNOSIS — D35.2 PITUITARY MICROADENOMA (HCC): ICD-10-CM

## 2020-08-27 DIAGNOSIS — E66.9 OBESITY (BMI 35.0-39.9 WITHOUT COMORBIDITY): ICD-10-CM

## 2020-08-27 DIAGNOSIS — Z78.0 POSTMENOPAUSAL: ICD-10-CM

## 2020-08-27 DIAGNOSIS — E55.9 VITAMIN D DEFICIENCY: ICD-10-CM

## 2020-08-27 DIAGNOSIS — E78.2 MIXED HYPERLIPIDEMIA: ICD-10-CM

## 2020-08-27 LAB
A/G RATIO: 1.8 (ref 1.1–2.2)
ALBUMIN SERPL-MCNC: 4.5 G/DL (ref 3.4–5)
ALP BLD-CCNC: 79 U/L (ref 40–129)
ALT SERPL-CCNC: 64 U/L (ref 10–40)
ANION GAP SERPL CALCULATED.3IONS-SCNC: 10 MMOL/L (ref 3–16)
AST SERPL-CCNC: 65 U/L (ref 15–37)
BILIRUB SERPL-MCNC: 0.8 MG/DL (ref 0–1)
BUN BLDV-MCNC: 10 MG/DL (ref 7–20)
CALCIUM SERPL-MCNC: 9.5 MG/DL (ref 8.3–10.6)
CHLORIDE BLD-SCNC: 101 MMOL/L (ref 99–110)
CHOLESTEROL, TOTAL: 149 MG/DL (ref 0–199)
CO2: 27 MMOL/L (ref 21–32)
CREAT SERPL-MCNC: 0.6 MG/DL (ref 0.6–1.1)
ESTIMATED AVERAGE GLUCOSE: 128.4 MG/DL
GFR AFRICAN AMERICAN: >60
GFR NON-AFRICAN AMERICAN: >60
GLOBULIN: 2.5 G/DL
GLUCOSE BLD-MCNC: 134 MG/DL (ref 70–99)
HBA1C MFR BLD: 6.1 %
HDLC SERPL-MCNC: 37 MG/DL (ref 40–60)
LDL CHOLESTEROL CALCULATED: 81 MG/DL
POTASSIUM SERPL-SCNC: 4.1 MMOL/L (ref 3.5–5.1)
PROLACTIN: 11.2 NG/ML
SODIUM BLD-SCNC: 138 MMOL/L (ref 136–145)
TOTAL PROTEIN: 7 G/DL (ref 6.4–8.2)
TRIGL SERPL-MCNC: 155 MG/DL (ref 0–150)
TSH SERPL DL<=0.05 MIU/L-ACNC: 3.72 UIU/ML (ref 0.27–4.2)
VITAMIN D 25-HYDROXY: 59.4 NG/ML
VLDLC SERPL CALC-MCNC: 31 MG/DL

## 2020-08-31 ENCOUNTER — OFFICE VISIT (OUTPATIENT)
Dept: ENDOCRINOLOGY | Age: 60
End: 2020-08-31
Payer: COMMERCIAL

## 2020-08-31 VITALS
HEART RATE: 75 BPM | SYSTOLIC BLOOD PRESSURE: 122 MMHG | TEMPERATURE: 98 F | BODY MASS INDEX: 40.66 KG/M2 | HEIGHT: 66 IN | RESPIRATION RATE: 16 BRPM | DIASTOLIC BLOOD PRESSURE: 79 MMHG | WEIGHT: 253 LBS

## 2020-08-31 PROCEDURE — 99214 OFFICE O/P EST MOD 30 MIN: CPT | Performed by: INTERNAL MEDICINE

## 2020-08-31 NOTE — PROGRESS NOTES
Diagnosis Date Noted    Obesity (BMI 35.0-39.9 without comorbidity) 02/24/2020    Mixed hyperlipidemia 02/24/2020    Postmenopausal 02/24/2020    Pituitary microadenoma ([address] 10/11/2018    Left breast mass 10/21/2015    Hearing loss 05/07/2014    Allergic rhinitis, seasonal 05/07/2014    Sialadenitis 12/10/2013    Low back pain 04/29/2013    Neck pain 04/29/2013     Past Surgical History:   Procedure Laterality Date    BLADDER SURGERY      bladder sling    BREAST BIOPSY Left 10/21/2015    LEFT BREAST BIOPSY                        COLONOSCOPY      DILATION AND CURETTAGE OF UTERUS      HIATAL HERNIA REPAIR  1997    HYSTERECTOMY  10/26/2018    KNEE CARTILAGE SURGERY  2008    KNEE SURGERY Left 12/17/2018    MOUTH SURGERY  2017    skin cancer lip     RHINOPLASTY  1981    UTERINE FIBROID SURGERY       Family History   Problem Relation Age of Onset    Diabetes Father         pre    Cancer Father     Emphysema Father     Stroke Father 48    Heart Disease Mother     Hypertension Mother     Cancer Other     Heart Disease Other     Diabetes Other     High Blood Pressure Other     Stroke Other     Other Sister         hypothyroid     Other Brother         colon polyps    Diabetes Maternal Aunt         pre    Stroke Maternal Grandmother     Stroke Maternal Grandfather     Emphysema Paternal Grandmother      Social History     Socioeconomic History    Marital status:      Spouse name: None    Number of children: 2    Years of education: None    Highest education level: None   Occupational History    None   Social Needs    Financial resource strain: None    Food insecurity     Worry: None     Inability: None    Transportation needs     Medical: None     Non-medical: None   Tobacco Use    Smoking status: Former Smoker    Smokeless tobacco: Never Used   Substance and Sexual Activity    Alcohol use: Yes     Comment: rarely    Drug use: No    Sexual activity: None Lifestyle    Physical activity     Days per week: None     Minutes per session: None    Stress: None   Relationships    Social connections     Talks on phone: None     Gets together: None     Attends Rastafari service: None     Active member of club or organization: None     Attends meetings of clubs or organizations: None     Relationship status: None    Intimate partner violence     Fear of current or ex partner: None     Emotionally abused: None     Physically abused: None     Forced sexual activity: None   Other Topics Concern    None   Social History Narrative    None     Current Outpatient Medications   Medication Sig Dispense Refill    cabergoline (DOSTINEX) 0.5 MG tablet TAKE 1 TABLET BY MOUTH TWICE WEEKLY 8 tablet 5    Fluticasone Propionate (FLONASE NA) by Nasal route 2 times daily       baclofen (LIORESAL) 10 MG tablet Take 10 mg by mouth 4 times daily Indications: 15 mg AM, 10mg afternoon, 15mg PM       CHOLINE PO Take 1 tablet by mouth daily      Cholecalciferol (VITAMIN D3) 2000 units TABS Take 1 tablet by mouth 2 times daily       Loratadine (CLARITIN) 10 MG CAPS Take by mouth      multivitamin (THERAGRAN) per tablet Take 1 tablet by mouth daily. No current facility-administered medications for this visit. Allergies   Allergen Reactions    Adhesive Tape      Steri strips     Biaxin [Clarithromycin]      Cannot take     Chlorhexidine     Penicillins Hives    Sulfa Antibiotics Itching and Other (See Comments)     Look like bad sun burn    Iodine Rash     BLISTERS  BLISTERS           Review of Systems:  I have reviewed the review of system questionnaire filled by the patient .   Patient was advised to contact PCP for non endocrine signs and symptoms     OBJECTIVE:   /79   Pulse 75   Temp 98 °F (36.7 °C)   Resp 16   Ht 5' 6\" (1.676 m)   Wt 253 lb (114.8 kg)   LMP 10/10/2018   BMI 40.84 kg/m²   Wt Readings from Last 3 Encounters:   08/31/20 253 lb (114.8 kg) 02/24/20 263 lb (119.3 kg)   11/11/19 254 lb (115.2 kg)       Physical Exam:  Constitutional: no acute distress, well appearing, well nourished  Psychiatric: oriented to person, place and time, judgement, insight and normal, recent and remote memory and intact and mood, affect are normal  Skin: skin and subcutaneous tissue is normal without mass,   Head and Face: examination of head and face revealed no abnormalities  Eyes: no lid or conjunctival swelling, no erythema or discharge, pupils are normal,   Ears/Nose: external inspection of ears and nose revealed no abnormalities, hearing is grossly normal  Oropharynx/Mouth/Face: lips, tongue and gums are normal with no lesions, the voice quality was normal  Neck: neck is supple and symmetric, with midline trachea and no masses, thyroid is normal    Pulmonary: no increased work of breathing or signs of respiratory distress, lungs are clear to auscultation  Cardiovascular: normal heart rate and rhythm, normal S1 and S2,   Musculoskeletal: normal gait and station,   Neurological: normal coordination, normal general cortical function      Lab Review:  Lab Results   Component Value Date    TSH 3.72 08/27/2020    TSH 3.01 02/21/2020    TSH 2.60 11/05/2019     Lab Results   Component Value Date    T4FREE 1.2 07/05/2019        ASSESSMENT/PLAN:    PITUITARY MICROADENOMA diagnosed in 1990.  -- Latest MRI in 2018  was done in [place]  as she needed open MRI which showed the resolution of pituitary adenoma Or it was not vsisble on that imaging study  Denies any headaches or blurred vision --prolactin 11.2   She has been on dostinex for years and her prolactin was slightly elevated in 2017-- dose of  dostinex increased to 0.5 mg twice weekly and prolactin has been normal since then   Thyroid hormone levels --are skewed -- thyroid antibodies were negative thyroid hormone levels are now in the normal range will continue to monitor     ----[facility] and [facility] 69 ,52 in April + minutes with patient and more than 50 % of this time was spent discussing and providing counseling and coordinating care of patient's multiple health issues. Return in about 6 months (around 2/28/2021).

## 2021-01-07 ENCOUNTER — TELEPHONE (OUTPATIENT)
Dept: ENDOCRINOLOGY | Age: 61
End: 2021-01-07

## 2021-01-07 DIAGNOSIS — E22.1 HYPERPROLACTINEMIA (HCC): ICD-10-CM

## 2021-01-07 RX ORDER — CABERGOLINE 0.5 MG/1
TABLET ORAL
Qty: 8 TABLET | Refills: 5 | Status: SHIPPED | OUTPATIENT
Start: 2021-01-07 | End: 2021-03-01 | Stop reason: SDUPTHER

## 2021-01-07 NOTE — TELEPHONE ENCOUNTER
PT would like to have a refill of Cabergoline 0.5mg sent to [facility] [phone]. PT states she is completely out of the medication and needs to have this called in today.

## 2021-02-26 DIAGNOSIS — D35.2 PITUITARY MICROADENOMA (HCC): ICD-10-CM

## 2021-02-26 DIAGNOSIS — E22.1 HYPERPROLACTINEMIA (HCC): ICD-10-CM

## 2021-02-26 DIAGNOSIS — E78.2 MIXED HYPERLIPIDEMIA: ICD-10-CM

## 2021-02-26 LAB
A/G RATIO: 1.6 (ref 1.1–2.2)
ALBUMIN SERPL-MCNC: 4.5 G/DL (ref 3.4–5)
ALP BLD-CCNC: 82 U/L (ref 40–129)
ALT SERPL-CCNC: 76 U/L (ref 10–40)
ANION GAP SERPL CALCULATED.3IONS-SCNC: 13 MMOL/L (ref 3–16)
AST SERPL-CCNC: 64 U/L (ref 15–37)
BILIRUB SERPL-MCNC: 0.7 MG/DL (ref 0–1)
BUN BLDV-MCNC: 11 MG/DL (ref 7–20)
CALCIUM SERPL-MCNC: 9.5 MG/DL (ref 8.3–10.6)
CHLORIDE BLD-SCNC: 100 MMOL/L (ref 99–110)
CHOLESTEROL, TOTAL: 158 MG/DL (ref 0–199)
CO2: 28 MMOL/L (ref 21–32)
CREAT SERPL-MCNC: 0.5 MG/DL (ref 0.6–1.2)
GFR AFRICAN AMERICAN: >60
GFR NON-AFRICAN AMERICAN: >60
GLOBULIN: 2.8 G/DL
GLUCOSE BLD-MCNC: 137 MG/DL (ref 70–99)
HDLC SERPL-MCNC: 36 MG/DL (ref 40–60)
LDL CHOLESTEROL CALCULATED: 91 MG/DL
POTASSIUM SERPL-SCNC: 4.1 MMOL/L (ref 3.5–5.1)
PROLACTIN: 8.7 NG/ML
SODIUM BLD-SCNC: 141 MMOL/L (ref 136–145)
TOTAL PROTEIN: 7.3 G/DL (ref 6.4–8.2)
TRIGL SERPL-MCNC: 153 MG/DL (ref 0–150)
TSH SERPL DL<=0.05 MIU/L-ACNC: 3.26 UIU/ML (ref 0.27–4.2)
VITAMIN D 25-HYDROXY: 57.8 NG/ML
VLDLC SERPL CALC-MCNC: 31 MG/DL

## 2021-02-27 LAB
ESTIMATED AVERAGE GLUCOSE: 134.1 MG/DL
HBA1C MFR BLD: 6.3 %

## 2021-03-01 ENCOUNTER — VIRTUAL VISIT (OUTPATIENT)
Dept: ENDOCRINOLOGY | Age: 61
End: 2021-03-01
Payer: COMMERCIAL

## 2021-03-01 DIAGNOSIS — Z78.0 POSTMENOPAUSAL: ICD-10-CM

## 2021-03-01 DIAGNOSIS — E78.2 MIXED HYPERLIPIDEMIA: ICD-10-CM

## 2021-03-01 DIAGNOSIS — R73.03 PREDIABETES: ICD-10-CM

## 2021-03-01 DIAGNOSIS — E22.1 HYPERPROLACTINEMIA (HCC): Primary | ICD-10-CM

## 2021-03-01 PROCEDURE — 99214 OFFICE O/P EST MOD 30 MIN: CPT | Performed by: INTERNAL MEDICINE

## 2021-03-01 RX ORDER — ALUMINUM HYDROXIDE, MAGNESIUM HYDROXIDE, DIMETHICONE 400; 400; 40 MG/5ML; MG/5ML; MG/5ML
1 LIQUID ORAL DAILY
COMMUNITY

## 2021-03-01 NOTE — PROGRESS NOTES
SUBJECTIVE:  Ja Robledo is a 62 y.o. female  seen for hyperprolactinemia, pituitary microadenoma which has resolved on imaging, prediabetes and hyperlipidemia pt was diagnosed with Pituitary microadenoma in 1995 at the time she was having issues with PCOS --she was having headaches and galactorrhea at the time she was started on Parlodel which she took for 3 years and she had repeat MRI which didn't show any pituitary microadenoma . She was later switched to Dostinex which she has continued up until now which has been prescribed by her ob/gyn   --In 2014 she started having neurological complaints like numbness and tingling in her hands , diagnosed with CTS bilateraly during her workup she had MRI brain done which again showed pituitary ADENOMA  She also has DJD in cervical spine   According to the patient she was size 4 up until college and then in her 30's she started with ovarian issues and gained a lot of wt , Acne  since age 37 she continued to have menstrual periods until she had her hysterectomy in October 2018 at the age of 63   She had rt knee replacement in sept 2016  -- June 2018 she underwent uterine fibroid removal as well as uterine ablation in June 2018   --She had a complete hysterectomy on oct  2018    ---CTS surgery in dec 2019 Rt hand ---symptoms improved       INTERIM      She has been taking her Dostinex regularly denies any nipple discharge denies any headache or blurred vision. She has lost 10 lbs since last visit .   She recently established with a new neurologist who is recommending to stop bromocriptine/cabergoline but patient does not want to do so she recently had an MRI done which did not show the pituitary maidenoma-    Past Medical History:   Diagnosis Date    Arthritis     osteoarthritis    Cellulitis 10/2018    abdomen    Cyst of fallopian tube 2018    bilat     H/O bronchitis     Pituitary microadenoma ([address]     Pituitary microadenoma ([address] 10/11/2018    Prediabetes     Sleep apnea     PT DOES NOT USE HER CPAP MACHINE    Torn meniscus 2018    L      Patient Active Problem List    Diagnosis Date Noted    Prediabetes 03/03/2021    Hyperprolactinemia ([address] 03/03/2021    Obesity (BMI 35.0-39.9 without comorbidity) 02/24/2020    Mixed hyperlipidemia 02/24/2020    Postmenopausal 02/24/2020    Pituitary microadenoma ([address] 10/11/2018    Left breast mass 10/21/2015    Hearing loss 05/07/2014    Allergic rhinitis, seasonal 05/07/2014    Sialadenitis 12/10/2013    Low back pain 04/29/2013    Neck pain 04/29/2013     Past Surgical History:   Procedure Laterality Date    BLADDER SURGERY      bladder sling    BREAST BIOPSY Left 10/21/2015    LEFT BREAST BIOPSY                        COLONOSCOPY      DILATION AND CURETTAGE OF UTERUS      HIATAL HERNIA REPAIR  1997    HYSTERECTOMY  10/26/2018    KNEE CARTILAGE SURGERY  2008    KNEE SURGERY Left 12/17/2018    MOUTH SURGERY  2017    skin cancer lip     RHINOPLASTY  1981    UTERINE FIBROID SURGERY       Family History   Problem Relation Age of Onset    Diabetes Father         pre    Cancer Father     Emphysema Father     Stroke Father 48    Heart Disease Mother     Hypertension Mother     Cancer Other     Heart Disease Other     Diabetes Other     High Blood Pressure Other     Stroke Other     Other Sister         hypothyroid     Other Brother         colon polyps    Diabetes Maternal Aunt         pre    Stroke Maternal Grandmother     Stroke Maternal Grandfather     Emphysema Paternal Grandmother      Social History     Socioeconomic History    Marital status:      Spouse name: None    Number of children: 2    Years of education: None    Highest education level: None   Occupational History    None   Social Needs    Financial resource strain: None    Food insecurity     Worry: None     Inability: None    Transportation needs     Medical: None     Non-medical: None   Tobacco Use    Smoking status: Former Smoker     Packs/day: 1.00     Years: 10.00     Pack years: 10.00     Types: Cigarettes     Quit date: 3/1/1991     Years since quittin.0    Smokeless tobacco: Never Used   Substance and Sexual Activity    Alcohol use: Yes     Comment: rarely    Drug use: No    Sexual activity: None   Lifestyle    Physical activity     Days per week: None     Minutes per session: None    Stress: None   Relationships    Social connections     Talks on phone: None     Gets together: None     Attends Yarsanism service: None     Active member of club or organization: None     Attends meetings of clubs or organizations: None     Relationship status: None    Intimate partner violence     Fear of current or ex partner: None     Emotionally abused: None     Physically abused: None     Forced sexual activity: None   Other Topics Concern    None   Social History Narrative    None     Current Outpatient Medications   Medication Sig Dispense Refill    Lactobacillus Rhamnosus, GG, (RA PROBIOTIC DIGESTIVE CARE) CAPS Take 1 capsule by mouth daily      cabergoline (DOSTINEX) 0.5 MG tablet TAKE 1 TABLET BY MOUTH TWICE WEEKLY 8 tablet 5    Fluticasone Propionate (FLONASE NA) by Nasal route 2 times daily       baclofen (LIORESAL) 10 MG tablet Take 10 mg by mouth 4 times daily Indications: 15 mg AM, 10mg afternoon, 15mg PM       CHOLINE PO Take 1 tablet by mouth daily      Cholecalciferol (VITAMIN D3) 2000 units TABS Take 1 tablet by mouth 2 times daily       Loratadine (CLARITIN) 10 MG CAPS Take by mouth      multivitamin (THERAGRAN) per tablet Take 1 tablet by mouth daily. No current facility-administered medications for this visit.       Allergies   Allergen Reactions    Adhesive Tape      Steri strips     Biaxin [Clarithromycin]      Cannot take     Chlorhexidine     Nickel     Penicillins Hives    Sulfa Antibiotics Itching and Other (See Comments)     Look like bad sun burn    Iodine Rash BLISTERS  BLISTERS         OBJECTIVE:   LMP 10/10/2018   Wt Readings from Last 3 Encounters:   08/31/20 253 lb (114.8 kg)   02/24/20 263 lb (119.3 kg)   11/11/19 254 lb (115.2 kg)       Physical Exam:    Constitutional: no acute distress, well appearing and well nourished  Psychiatric: oriented to person, place and time, judgement and insight and normal, recent and remote memory intact and mood and affect are normal  Skin: skin and subcutaneous tissue is normal without visible mass,   Head and Face: visual inspection  of head and face revealed no abnormalities  Eyes: visual inspection showed no lid or conjunctival swelling, erythema or discharge, pupils are normal, equal, round  Ears/Nose: external inspection of ears and nose revealed no abnormalities, hearing is grossly normal  Oropharynx/Mouth/Face: lips, tongue and gums appear  normal with no lesions, the voice quality was normal  Neck: neck appears symmetric, with no visible masses,   Pulmonary: no increased work of breathing or signs of respiratory distress,  Musculoskeletal: normal on inspection    Neurological: normal coordination and normal general cortical function        Lab Review:  Lab Results   Component Value Date    TSH 3.26 02/26/2021    TSH 3.72 08/27/2020    TSH 3.01 02/21/2020     Lab Results   Component Value Date    T4FREE 1.2 07/05/2019        ASSESSMENT/PLAN:    PITUITARY MICROADENOMA diagnosed in 1990.  -- Latest MRI in 2020 was done in [place]  as she needed open MRI which showed the resolution of pituitary adenoma Or it was not vsisble on that imaging study--ordered by her neurologist I do not have the access to the report  Denies any headaches or blurred vision --prolactin 11.2   She has been on dostinex for years and her prolactin was slightly elevated in 2017-- dose of  dostinex increased to 0.5 mg twice weekly and prolactin has been normal since then , her neurologist recommended stopping the cabergoline patient is not interested in that we will likely continue with this the levels are in normal range I did tell patient that if we take her off of the cabergoline and in a month if the levels go back higher we will resume it but she does not want to do it at this point.   Thyroid hormone levels --are skewed -- thyroid antibodies were negative thyroid hormone levels are now in the normal range will continue to monitor     ----[facility] and LH 69 ,52 in April 2019    Cortisol 14>> 18 Acth 48---  ---24 hr urine cortisol Which was normal 30 in may 2016   IGF-1 levels was normal 81     prediabetes  Previous A1c 6.1 >>6.3 >>5.8 >>5.9 >6.3>5.8>>6.4>>6.1>>5.9>>6.1>>6.3   prediabetic range she was given diabetic education in the past   Fasting glucose was 115 in 11/2019 >>117 in feb 2020>>134 augu 2020 ( she took a snack late at night )   she has been to diabetic educator in the past  We reinforced carb restriction   Microalbumin to creatinine ratio was normal     Postmenopausal  ---  Now postmenopausal   DEXA scan was normal BMD   She had Menarche at age 7   Menopause after hysterectomy in oct 2018   [facility] 61 in July 2019       Fatty liver   Elevated LFTS   Improved in February 2020, results were discussed in detail with the patient  Advised to discuss with PCP and work on losing weight     HYPERLIPIDEMIA    After she modified her diet, her LDL   deropped from 105>>86>>76>>90 >>92 >.81 in august 2020   Triglycerides 170 she has tried otc fish oil before with elevated TGs   --- Vascepa not approved by insurance so she is now taking over-the-counter fish oil again      OBESITY  Advised to count her calories and start exercise program-- discussed caloric goal and use of AP[employer] like  my fitness spell     CTS --neurological symptoms   Follows with neurology    She did CTS surgery in dec 2019 and she has her  back now     Reviewed and/or ordered clinical lab results Yes  Reviewed and/or ordered radiology tests Yes   Reviewed and/or ordered other diagnostic tests Yes  Made a decision to obtain old records Yes  Reviewed and summarized old records Yes      Tessa Finn was counseled regarding symptoms of current diagnosis, course and complications of disease if inadequately treated, side effects of medications, diagnosis, treatment options, and prognosis, risks, benefits, complications, and alternatives of treatment, labs, imaging and other studies and treatment targets and goals. She understands instructions and counseling. TELEHEALTH EVALUATION -- Audio/Visual (During [facility] public health emergency)  Pursuant to the emergency declaration under the [facility], [facility] waiver authority and the [employer] and [employer] Act, this Virtual  Visit was conducted, with patient's consent, to reduce the patient's risk of exposure to COVID-19 and provide care for  patient. Services were provided through a video synchronous discussion virtually to substitute for in-person clinic visit. Patient's location : home     Patient provided verbal consent to use the video visit. Total time spent : 30 + min Reviewing the chart, conducting an interview, performing a limited exam by video and educating the patient on my assessment plan. Return in about 3 months (around 6/1/2021).

## 2021-03-03 PROBLEM — R73.03 PREDIABETES: Status: ACTIVE | Noted: 2021-03-03

## 2021-03-03 PROBLEM — E22.1 HYPERPROLACTINEMIA (HCC): Status: ACTIVE | Noted: 2021-03-03

## 2021-03-03 RX ORDER — CABERGOLINE 0.5 MG/1
TABLET ORAL
Qty: 24 TABLET | Refills: 1 | Status: SHIPPED | OUTPATIENT
Start: 2021-03-03 | End: 2021-09-13 | Stop reason: SDUPTHER

## 2021-09-09 LAB
A/G RATIO: 1.7 (ref 1.1–2.2)
ALBUMIN SERPL-MCNC: 4.5 G/DL (ref 3.4–5)
ALP BLD-CCNC: 74 U/L (ref 40–129)
ALT SERPL-CCNC: 58 U/L (ref 10–40)
ANION GAP SERPL CALCULATED.3IONS-SCNC: 16 MMOL/L (ref 3–16)
AST SERPL-CCNC: 52 U/L (ref 15–37)
BILIRUB SERPL-MCNC: 0.7 MG/DL (ref 0–1)
BUN BLDV-MCNC: 9 MG/DL (ref 7–20)
CALCIUM SERPL-MCNC: 9.4 MG/DL (ref 8.3–10.6)
CHLORIDE BLD-SCNC: 100 MMOL/L (ref 99–110)
CHOLESTEROL, TOTAL: 152 MG/DL (ref 0–199)
CO2: 25 MMOL/L (ref 21–32)
CREAT SERPL-MCNC: 0.6 MG/DL (ref 0.6–1.2)
ESTIMATED AVERAGE GLUCOSE: 119.8 MG/DL
GFR AFRICAN AMERICAN: >60
GFR NON-AFRICAN AMERICAN: >60
GLOBULIN: 2.6 G/DL
GLUCOSE BLD-MCNC: 124 MG/DL (ref 70–99)
HBA1C MFR BLD: 5.8 %
HDLC SERPL-MCNC: 40 MG/DL (ref 40–60)
LDL CHOLESTEROL CALCULATED: 81 MG/DL
POTASSIUM SERPL-SCNC: 4 MMOL/L (ref 3.5–5.1)
PROLACTIN: 8 NG/ML
SODIUM BLD-SCNC: 141 MMOL/L (ref 136–145)
TOTAL PROTEIN: 7.1 G/DL (ref 6.4–8.2)
TRIGL SERPL-MCNC: 155 MG/DL (ref 0–150)
TSH SERPL DL<=0.05 MIU/L-ACNC: 3.19 UIU/ML (ref 0.27–4.2)
VITAMIN D 25-HYDROXY: 56 NG/ML
VLDLC SERPL CALC-MCNC: 31 MG/DL

## 2021-09-13 ENCOUNTER — OFFICE VISIT (OUTPATIENT)
Dept: ENDOCRINOLOGY | Age: 61
End: 2021-09-13
Payer: COMMERCIAL

## 2021-09-13 VITALS
BODY MASS INDEX: 40.98 KG/M2 | HEIGHT: 66 IN | WEIGHT: 255 LBS | HEART RATE: 78 BPM | DIASTOLIC BLOOD PRESSURE: 76 MMHG | RESPIRATION RATE: 16 BRPM | SYSTOLIC BLOOD PRESSURE: 132 MMHG

## 2021-09-13 DIAGNOSIS — E78.2 MIXED HYPERLIPIDEMIA: ICD-10-CM

## 2021-09-13 DIAGNOSIS — E22.1 HYPERPROLACTINEMIA (HCC): Primary | ICD-10-CM

## 2021-09-13 DIAGNOSIS — R73.03 PREDIABETES: ICD-10-CM

## 2021-09-13 DIAGNOSIS — E66.9 OBESITY (BMI 35.0-39.9 WITHOUT COMORBIDITY): ICD-10-CM

## 2021-09-13 DIAGNOSIS — D35.2 PITUITARY MICROADENOMA (HCC): ICD-10-CM

## 2021-09-13 PROCEDURE — 99214 OFFICE O/P EST MOD 30 MIN: CPT | Performed by: INTERNAL MEDICINE

## 2021-09-13 RX ORDER — CABERGOLINE 0.5 MG/1
TABLET ORAL
Qty: 24 TABLET | Refills: 1 | Status: SHIPPED | OUTPATIENT
Start: 2021-09-13 | End: 2022-03-21

## 2021-09-13 NOTE — PROGRESS NOTES
SUBJECTIVE:  Carter Hancock is a 62 y.o. female  seen for hyperprolactinemia, pituitary microadenoma which has resolved on imaging, prediabetes and hyperlipidemia pt was diagnosed with Pituitary microadenoma in 1995 at the time she was having issues with PCOS --she was having headaches and galactorrhea at the time she was started on Parlodel which she took for 3 years and she had repeat MRI which didn't show any pituitary microadenoma . She was later switched to Dostinex which she has continued up until now which has been prescribed by her ob/gyn   --In 2014 she started having neurological complaints like numbness and tingling in her hands , diagnosed with CTS bilateraly during her workup she had MRI brain done which again showed pituitary ADENOMA  She also has DJD in cervical spine   According to the patient she was size 4 up until college and then in her 30's she started with ovarian issues and gained a lot of wt , Acne  since age 37 she continued to have menstrual periods until she had her hysterectomy in October 2018 at the age of 63   She had rt knee replacement in sept 2016  -- June 2018 she underwent uterine fibroid removal as well as uterine ablation in June 2018   --She had a complete hysterectomy on oct  2018    ---CTS surgery in dec 2019 Rt hand ---symptoms improved       INTERIM      She has been taking her Dostinex regularly denies any nipple discharge denies any headache or blurred vision. She has lost 8  lbs since last visit . She has noticed worsening neuropathy and now will follow with a new neurologist      ROS  I have reviewed the review of system questionnaire filled by the patient .   Patient was advised to contact PCP for non endocrine signs and symptoms '    Past Medical History:   Diagnosis Date    Arthritis     osteoarthritis    Cellulitis 10/2018    abdomen    Cyst of fallopian tube 2018    bilat     H/O bronchitis     Pituitary microadenoma (HCC)     Pituitary microadenoma ([address] 10/11/2018    Prediabetes     Sleep apnea     PT DOES NOT USE HER CPAP MACHINE    Torn meniscus 2018    L      Patient Active Problem List    Diagnosis Date Noted    Prediabetes 03/03/2021    Hyperprolactinemia ([facility] [address] 03/03/2021    Obesity (BMI 35.0-39.9 without comorbidity) 02/24/2020    Mixed hyperlipidemia 02/24/2020    Postmenopausal 02/24/2020    Pituitary microadenoma ([address] 10/11/2018    Left breast mass 10/21/2015    Hearing loss 05/07/2014    Allergic rhinitis, seasonal 05/07/2014    Sialadenitis 12/10/2013    Low back pain 04/29/2013    Neck pain 04/29/2013     Past Surgical History:   Procedure Laterality Date    BLADDER SURGERY      bladder sling    BREAST BIOPSY Left 10/21/2015    LEFT BREAST BIOPSY                        COLONOSCOPY      DILATION AND CURETTAGE OF UTERUS      HIATAL HERNIA REPAIR  1997    HYSTERECTOMY  10/26/2018    KNEE CARTILAGE SURGERY  2008    KNEE SURGERY Left 12/17/2018    MOUTH SURGERY  2017    skin cancer lip     RHINOPLASTY  1981    UTERINE FIBROID SURGERY       Family History   Problem Relation Age of Onset    Diabetes Father         pre    Cancer Father     Emphysema Father     Stroke Father 48    Heart Disease Mother     Hypertension Mother     Cancer Other     Heart Disease Other     Diabetes Other     High Blood Pressure Other     Stroke Other     Other Sister         hypothyroid     Other Brother         colon polyps    Diabetes Maternal Aunt         pre    Stroke Maternal Grandmother     Stroke Maternal Grandfather     Emphysema Paternal Grandmother      Social History     Socioeconomic History    Marital status:      Spouse name: None    Number of children: 2    Years of education: None    Highest education level: None   Occupational History    None   Tobacco Use    Smoking status: Former Smoker     Packs/day: 1.00     Years: 10.00     Pack years: 10.00     Types: Cigarettes     Quit date: 3/1/1991     Years since quittin.5    Smokeless tobacco: Never Used   Vaping Use    Vaping Use: Never used   Substance and Sexual Activity    Alcohol use: Yes     Comment: rarely    Drug use: No    Sexual activity: None   Other Topics Concern    None   Social History Narrative    None     Social Determinants of Health     Financial Resource Strain:     Difficulty of Paying Living Expenses:    Food Insecurity:     Worried About Running Out of Food in the Last Year:     Ran Out of Food in the Last Year:    Transportation Needs:     Lack of Transportation (Medical):  Lack of Transportation (Non-Medical):    Physical Activity:     Days of Exercise per Week:     Minutes of Exercise per Session:    Stress:     Feeling of Stress :    Social Connections:     Frequency of Communication with Friends and Family:     Frequency of Social Gatherings with Friends and Family:     Attends Buddhism Services:     Active Member of Clubs or Organizations:     Attends Club or Organization Meetings:     Marital Status:    Intimate Partner Violence:     Fear of Current or Ex-Partner:     Emotionally Abused:     Physically Abused:     Sexually Abused:      Current Outpatient Medications   Medication Sig Dispense Refill    cabergoline (DOSTINEX) 0.5 MG tablet TAKE 1 TABLET BY MOUTH TWICE WEEKLY 24 tablet 1    Lactobacillus Rhamnosus, GG, ( PROBIOTIC DIGESTIVE CARE) CAPS Take 1 capsule by mouth daily      Fluticasone Propionate (FLONASE NA) by Nasal route 2 times daily       baclofen (LIORESAL) 10 MG tablet Take 10 mg by mouth 4 times daily Indications: 15 mg AM, 10mg afternoon, 15mg PM       CHOLINE PO Take 1 tablet by mouth daily      Cholecalciferol (VITAMIN D3) 2000 units TABS Take 1 tablet by mouth 2 times daily       Loratadine (CLARITIN) 10 MG CAPS Take by mouth      multivitamin (THERAGRAN) per tablet Take 1 tablet by mouth daily. No current facility-administered medications for this visit.      Allergies Allergen Reactions    Adhesive Tape      Steri strips     Biaxin [Clarithromycin]      Cannot take     Chlorhexidine     Nickel     Penicillins Hives    Sulfa Antibiotics Itching and Other (See Comments)     Look like bad sun burn    Iodine Rash     BLISTERS  BLISTERS         OBJECTIVE:   /76   Pulse 78   Resp 16   Ht 5' 5.5\" (1.664 m)   Wt 255 lb (115.7 kg)   LMP 10/10/2018   BMI 41.79 kg/m²   Wt Readings from Last 3 Encounters:   09/13/21 255 lb (115.7 kg)   08/31/20 253 lb (114.8 kg)   02/24/20 263 lb (119.3 kg)       Prolactin  Specimen:  Serum   Ref Range & Units 4 yr ago   Prolactin 2.7 - 19.6 ng/mL 16.84           Date: 07/25/17   Received From: Mary Rajput from [address] [place] [address] to Prolactin  Component 07/25/17 06/14/17 08/27/16 12/09/15   Prolactin 16.84 32. 98High  23. 23High  18.50       Physical Exam:    Constitutional: no acute distress, well appearing, well nourished  Psychiatric: oriented to person, place and time, judgement, insight and normal, recent and remote memory and intact and mood, affect are normal  Skin: skin and subcutaneous tissue is normal without mass,   Head and Face: examination of head and face revealed no abnormalities  Eyes: no lid or conjunctival swelling, no erythema or discharge, pupils are normal,   Ears/Nose: external inspection of ears and nose revealed no abnormalities, hearing is grossly normal  Oropharynx/Mouth/Face: lips, tongue and gums are normal with no lesions, the voice quality was normal  Neck: neck is supple and symmetric, with midline trachea and no masses, thyroid is normal    Pulmonary: no increased work of breathing or signs of respiratory distress, lungs are clear to auscultation  Cardiovascular: normal heart rate and rhythm, normal S1 and S2,   Musculoskeletal: normal gait and station,   Neurological: normal coordination, normal general cortical function    Lab Review:  Lab Results   Component Value Date    TSH 3.19 09/08/2021 TSH 3.26 02/26/2021    TSH 3.72 08/27/2020     Lab Results   Component Value Date    T4FREE 1.2 07/05/2019        ASSESSMENT/PLAN:    PITUITARY MICROADENOMA diagnosed in [address] [facility].  -- Latest MRI in 2020 was done in [place]  as she needed open MRI which showed the resolution of pituitary adenoma Or it was not vsisble on that imaging study--    Denies any headaches or blurred vision --prolactin 11.2 >>8.0 in sept 2021   She has been on dostinex for years and her prolactin was slightly elevated in 2017-- dose of  dostinex increased to 0.5 mg twice weekly and prolactin has been normal since then , her neurologist recommended stopping the cabergoline patient is not interested in that we will likely continue with this the levels are in normal range I did tell patient that if we take her off of the cabergoline and in a month if the levels go back higher we will resume it but she does not want to do it at this point.     Thyroid hormone levels --are skewed -- thyroid antibodies were negative thyroid hormone levels are now in the normal range will continue to monitor     ----[facility] and  70 ,53 in April 2019    Cortisol 14>> 18 Acth 48---  ---24 hr urine cortisol Which was normal 30 in may 2016   IGF-1 levels was normal 81     prediabetes  Previous A1c 6.1 >>6.3 >>5.8 >>5.9 >6.3>5.8>>6.4>>6.1>>5.9>>6.1>>6.3>>5.8   ---prediabetic range she was given diabetic education in the past   Fasting glucose was 115 in 11/2019 >>117 in feb 2020>>134 augu 2020 ( she took a snack late at night )   she has been to diabetic educator in the past  We reinforced carb restriction   Microalbumin to creatinine ratio was normal     Postmenopausal  ---  Now postmenopausal   DEXA scan was normal BMD   She had Menarche at age 7   Menopause after hysterectomy in oct 2018   [facility] 62 in July 2019       Fatty liver   Elevated LFTS   Improved in February 2020, results were discussed in detail with the patient  Advised to discuss with PCP and work on losing weight     HYPERLIPIDEMIA    After she modified her diet, her LDL   deropped from 105>>86>>76>>90 >>92 >.81 in august 2020   Triglycerides 170 she has tried otc fish oil before with elevated TGs   --- Vascepa not approved by insurance so she is now taking over-the-counter fish oil again      OBESITY  Advised to count her calories and start exercise program-- discussed caloric goal and use of APPS like  [employer]     CTS --neurological symptoms   Follows with neurology    She did CTS surgery in dec 2019 and she has her  back now     Reviewed and/or ordered clinical lab results Yes  Reviewed and/or ordered radiology tests Yes   Reviewed and/or ordered other diagnostic tests Yes  Made a decision to obtain old records Yes  Reviewed and summarized old records Yes      Gaudencio Maynard was counseled regarding symptoms of current diagnosis, course and complications of disease if inadequately treated, side effects of medications, diagnosis, treatment options, and prognosis, risks, benefits, complications, and alternatives of treatment, labs, imaging and other studies and treatment targets and goals. She understands instructions and counseling. I spent  30 + minutes which includes reviewing patient chart , interpreting previous lab results  , discussing and providing counseling and coordinating care of patient's multiple health issues with  the patient. Return in about 6 months (around 3/13/2022).

## 2022-03-12 DIAGNOSIS — R73.03 PREDIABETES: ICD-10-CM

## 2022-03-12 DIAGNOSIS — E66.9 OBESITY (BMI 35.0-39.9 WITHOUT COMORBIDITY): ICD-10-CM

## 2022-03-12 DIAGNOSIS — E22.1 HYPERPROLACTINEMIA (HCC): ICD-10-CM

## 2022-03-12 DIAGNOSIS — D35.2 PITUITARY MICROADENOMA (HCC): ICD-10-CM

## 2022-03-12 LAB
A/G RATIO: 1.9 (ref 1.1–2.2)
ALBUMIN SERPL-MCNC: 4.4 G/DL (ref 3.4–5)
ALP BLD-CCNC: 66 U/L (ref 40–129)
ALT SERPL-CCNC: 39 U/L (ref 10–40)
ANION GAP SERPL CALCULATED.3IONS-SCNC: 14 MMOL/L (ref 3–16)
AST SERPL-CCNC: 37 U/L (ref 15–37)
BILIRUB SERPL-MCNC: 0.6 MG/DL (ref 0–1)
BUN BLDV-MCNC: 8 MG/DL (ref 7–20)
CALCIUM SERPL-MCNC: 9 MG/DL (ref 8.3–10.6)
CHLORIDE BLD-SCNC: 104 MMOL/L (ref 99–110)
CHOLESTEROL, TOTAL: 153 MG/DL (ref 0–199)
CO2: 24 MMOL/L (ref 21–32)
CREAT SERPL-MCNC: 0.5 MG/DL (ref 0.6–1.2)
GFR AFRICAN AMERICAN: >60
GFR NON-AFRICAN AMERICAN: >60
GLUCOSE BLD-MCNC: 125 MG/DL (ref 70–99)
HDLC SERPL-MCNC: 36 MG/DL (ref 40–60)
LDL CHOLESTEROL CALCULATED: 86 MG/DL
POTASSIUM SERPL-SCNC: 4.2 MMOL/L (ref 3.5–5.1)
PROLACTIN: 9.4 NG/ML
SODIUM BLD-SCNC: 142 MMOL/L (ref 136–145)
TOTAL PROTEIN: 6.7 G/DL (ref 6.4–8.2)
TRIGL SERPL-MCNC: 156 MG/DL (ref 0–150)
TSH SERPL DL<=0.05 MIU/L-ACNC: 3.18 UIU/ML (ref 0.27–4.2)
VLDLC SERPL CALC-MCNC: 31 MG/DL

## 2022-03-13 LAB
ESTIMATED AVERAGE GLUCOSE: 125.5 MG/DL
HBA1C MFR BLD: 6 %

## 2022-03-14 ENCOUNTER — OFFICE VISIT (OUTPATIENT)
Dept: ENDOCRINOLOGY | Age: 62
End: 2022-03-14
Payer: COMMERCIAL

## 2022-03-14 VITALS
RESPIRATION RATE: 16 BRPM | WEIGHT: 263.4 LBS | HEART RATE: 69 BPM | SYSTOLIC BLOOD PRESSURE: 126 MMHG | BODY MASS INDEX: 43.89 KG/M2 | HEIGHT: 65 IN | DIASTOLIC BLOOD PRESSURE: 80 MMHG

## 2022-03-14 DIAGNOSIS — R73.03 PREDIABETES: ICD-10-CM

## 2022-03-14 DIAGNOSIS — E22.1 HYPERPROLACTINEMIA (HCC): Primary | ICD-10-CM

## 2022-03-14 PROCEDURE — 99214 OFFICE O/P EST MOD 30 MIN: CPT | Performed by: INTERNAL MEDICINE

## 2022-03-14 RX ORDER — SEMAGLUTIDE 1.34 MG/ML
INJECTION, SOLUTION SUBCUTANEOUS
Qty: 2 ML | Refills: 3 | Status: SHIPPED | OUTPATIENT
Start: 2022-03-14 | End: 2022-10-17

## 2022-03-14 NOTE — PATIENT INSTRUCTIONS
Patient Education        nateglinide (oral)  Pronunciation:  chino harrison  Brand:  Starlix  What is the most important information I should know about nateglinide? You should not use nateglinide if you have diabetic ketoacidosis (call your doctor for treatment). What is nateglinide? Nateglinide is used together with diet and exercise to improve blood sugar control in adults with type 2 diabetes mellitus. This medicine is not for treating type 1 diabetes. Other diabetes medicines are sometimes used in combination with nateglinide if needed. Nateglinide may also be used for purposes not listed in this medication guide. What should I discuss with my healthcare provider before taking nateglinide? You should not use nateglinide if you are allergic to it, or if you have:  · diabetic ketoacidosis (call your doctor for treatment). Tell your doctor if you have ever had:  · liver disease; or  · gout. Follow your doctor's instructions about using this medicine if you are pregnant or you become pregnant. Controlling diabetes is very important during pregnancy, and having high blood sugar may cause complications in both the mother and the baby. You should not breastfeed while using this medicine. Nateglinide is not approved for use by anyone younger than 25years old. How should I take nateglinide? Follow all directions on your prescription label and read all medication guides or instruction sheets. Use the medicine exactly as directed. Nateglinide is usually taken 3 times daily, within 1 to 30 minutes before a meal. If you skip a meal, do not take your dose of nateglinide. Wait until your next meal.  You may have low blood sugar (hypoglycemia) and feel very hungry, dizzy, irritable, confused, anxious, or shaky. To quickly treat hypoglycemia, eat or drink a fast-acting source of sugar (fruit juice, hard candy, crackers, raisins, or non-diet soda).   Your doctor may prescribe a glucagon injection kit in case you have severe hypoglycemia. Be sure your family or close friends know how to give you this injection in an emergency. Also watch for signs of high blood sugar (hyperglycemia) such as increased thirst or urination. Blood sugar levels can be affected by stress, illness, surgery, exercise, alcohol use, or skipping meals. Ask your doctor before changing your dose or medication schedule. Nateglinide is only part of a treatment program that may also include diet, exercise, weight control, blood sugar testing, and special medical care. Follow your doctor's instructions very closely. Store at room temperature away from moisture and heat. What happens if I miss a dose? Take your dose as soon as you can, but only if you are getting ready to eat a meal.  If you skip a meal, skip the missed dose and wait until your next meal. Do not take two doses at one time. What happens if I overdose? Seek emergency medical attention or call the Poison Help line at 1-908.960.7412. You may have signs of low blood sugar, such as extreme weakness, blurred vision, sweating, trouble speaking, tremors, stomach pain, confusion, and seizure (convulsions). What should I avoid while taking nateglinide? Avoid drinking alcohol. It lowers blood sugar and may interfere with your diabetes treatment. What are the possible side effects of nateglinide? Get emergency medical help if you have signs of an allergic reaction: hives; difficulty breathing; swelling of your face, lips, tongue, or throat. Call your doctor at once if you have:  · seizure (convulsions); or  · jaundice (yellowing of the skin or eyes). Common side effects may include:  · runny or stuffy nose, sneezing, cough, cold or flu symptoms;  · diarrhea;  · back pain;  · dizziness; or  · joint pain or stiffness. This is not a complete list of side effects and others may occur. Call your doctor for medical advice about side effects.  You may report side effects to FDA at 1-800-FDA-1088. What other drugs will affect nateglinide? Nateglinide may not work as well when you use other medicines at the same time. Some drugs can also cause you to have fewer symptoms of hypoglycemia, making it harder to tell when your blood sugar is low. This includes prescription and over-the-counter medicines, vitamins, and herbal products. Not all possible interactions are listed here. Tell your doctor about all medicines you start or stop using. Where can I get more information? Your pharmacist can provide more information about nateglinide. Remember, keep this and all other medicines out of the reach of children, never share your medicines with others, and use this medication only for the indication prescribed. Every effort has been made to ensure that the information provided by [address] is accurate, up-to-date, and complete, but no guarantee is made to that effect. Drug information contained herein may be time sensitive. [facility] information has been compiled for use by healthcare practitioners and consumers in the [place] and therefore [facility] does not warrant that uses outside of the [place] are appropriate, unless specifically indicated otherwise. [facility]'s drug information does not endorse drugs, diagnose patients or recommend therapy. [facility] drug information is an informational resource designed to assist licensed healthcare practitioners in caring for their patients and/or to serve consumers viewing this service as a supplement to, and not a substitute for, the expertise, skill, knowledge and judgment of healthcare practitioners. The absence of a warning for a given drug or drug combination in no way should be construed to indicate that the drug or drug combination is safe, effective or appropriate for any given patient. [facility] does not assume any responsibility for any aspect of healthcare administered with the aid of information [facility] provides.  The information contained herein is not intended to cover all possible uses, directions, precautions, warnings, drug interactions, allergic reactions, or adverse effects. If you have questions about the drugs you are taking, check with your doctor, nurse or pharmacist.  Copyright 6954-7430 [address] [place]. Version: 6.02. Revision date: 5/21/2020. Care instructions adapted under license by [facility] ([facility]). If you have questions about a medical condition or this instruction, always ask your healthcare professional. [place] [address] any warranty or liability for your use of this information.

## 2022-03-14 NOTE — PROGRESS NOTES
SUBJECTIVE:  Jimena Hughes is a 65 y.o. female  seen for hyperprolactinemia, pituitary microadenoma which has resolved on imaging, prediabetes and hyperlipidemia pt was diagnosed with Pituitary microadenoma in 1995 at the time she was having issues with PCOS --she was having headaches and galactorrhea at the time she was started on Parlodel which she took for 3 years and she had repeat MRI which didn't show any pituitary microadenoma . She was later switched to Dostinex which she has continued up until now which has been prescribed by her ob/gyn   --In 2014 she started having neurological complaints like numbness and tingling in her hands , diagnosed with CTS bilateraly during her workup she had MRI brain done which again showed pituitary ADENOMA  She also has DJD in cervical spine   According to the patient she was size 4 up until college and then in her 30's she started with ovarian issues and gained a lot of wt , Acne  since age 37 she continued to have menstrual periods until she had her hysterectomy in October 2018 at the age of 63   She had rt knee replacement in sept 2016  -- June 2018 she underwent uterine fibroid removal as well as uterine ablation in June 2018   --She had a complete hysterectomy on oct  2018    ---CTS surgery in dec 2019 Rt hand ---symptoms improved       INTERIM      She has been taking her Dostinex regularly denies any nipple discharge denies any headache or blurred vision. She underwent MRI of her spine and brain, pituitary imaging was normal.      ROS  I have reviewed the review of system questionnaire filled by the patient .   Patient was advised to contact PCP for non endocrine signs and symptoms '    Past Medical History:   Diagnosis Date    Arthritis     osteoarthritis    Cellulitis 10/2018    abdomen    Cyst of fallopian tube 2018    bilat     H/O bronchitis     Pituitary microadenoma ([address]     Pituitary microadenoma ([address] 10/11/2018    Prediabetes     Sleep apnea     PT DOES NOT USE HER CPAP MACHINE    Torn meniscus     L      Patient Active Problem List    Diagnosis Date Noted    Prediabetes 2021    Hyperprolactinemia ([address] 2021    Obesity (BMI 35.0-39.9 without comorbidity) 2020    Mixed hyperlipidemia 2020    Postmenopausal 2020    Pituitary microadenoma ([address] 10/11/2018    Left breast mass 10/21/2015    Hearing loss 2014    Allergic rhinitis, seasonal 2014    Sialadenitis 12/10/2013    Low back pain 2013    Neck pain 2013     Past Surgical History:   Procedure Laterality Date    BLADDER SURGERY      bladder sling    BREAST BIOPSY Left 10/21/2015    LEFT BREAST BIOPSY                        COLONOSCOPY      DILATION AND CURETTAGE OF UTERUS      HIATAL HERNIA REPAIR  1997    HYSTERECTOMY  10/26/2018    KNEE CARTILAGE SURGERY  2008    KNEE SURGERY Left 2018    MOUTH SURGERY  2017    skin cancer lip     RHINOPLASTY  1981    UTERINE FIBROID SURGERY       Family History   Problem Relation Age of Onset    Diabetes Father         pre    Cancer Father     Emphysema Father     Stroke Father 48    Heart Disease Mother     Hypertension Mother     Cancer Other     Heart Disease Other     Diabetes Other     High Blood Pressure Other     Stroke Other     Other Sister         hypothyroid     Other Brother         colon polyps    Diabetes Maternal Aunt         pre    Stroke Maternal Grandmother     Stroke Maternal Grandfather     Emphysema Paternal Grandmother      Social History     Socioeconomic History    Marital status:      Spouse name: None    Number of children: 2    Years of education: None    Highest education level: None   Occupational History    None   Tobacco Use    Smoking status: Former Smoker     Packs/day: 1.00     Years: 10.00     Pack years: 10.00     Types: Cigarettes     Quit date: 3/1/1991     Years since quittin.0    Smokeless tobacco: Never Used Vaping Use    Vaping Use: Never used   Substance and Sexual Activity    Alcohol use: Yes     Comment: rarely    Drug use: No    Sexual activity: None   Other Topics Concern    None   Social History Narrative    None     Social Determinants of Health     Financial Resource Strain:     Difficulty of Paying Living Expenses: Not on file   Food Insecurity:     Worried About Running Out of Food in the Last Year: Not on file    Karina of Food in the Last Year: Not on file   Transportation Needs:     Lack of Transportation (Medical): Not on file    Lack of Transportation (Non-Medical):  Not on file   Physical Activity:     Days of Exercise per Week: Not on file    Minutes of Exercise per Session: Not on file   Stress:     Feeling of Stress : Not on file   Social Connections:     Frequency of Communication with Friends and Family: Not on file    Frequency of Social Gatherings with Friends and Family: Not on file    Attends [employer] Services: Not on file    Active Member of [address] [place] [address] or Organizations: Not on file    Attends Club or Organization Meetings: Not on file    Marital Status: Not on file   Intimate Partner Violence:     Fear of Current or Ex-Partner: Not on file    Emotionally Abused: Not on file    Physically Abused: Not on file    Sexually Abused: Not on file   Housing Stability:     Unable to Pay for Housing in the Last Year: Not on file    Number of [place] in the Last Year: Not on file    Unstable Housing in the Last Year: Not on file     Current Outpatient Medications   Medication Sig Dispense Refill    Semaglutide,0.25 or 0.5MG/DOS, (OZEMPIC, 0.25 OR 0.5 MG/DOSE,) 2 MG/1.5ML SOPN Take 0.25 mg weekly 2 mL 3    cabergoline (DOSTINEX) 0.5 MG tablet TAKE 1 TABLET BY MOUTH TWICE WEEKLY 24 tablet 1    Lactobacillus Rhamnosus, GG, ( PROBIOTIC DIGESTIVE CARE) CAPS Take 1 capsule by mouth daily      Fluticasone Propionate (FLONASE NA) by Nasal route 2 times daily       baclofen (LIORESAL) 10 MG tablet Take 10 mg by mouth 4 times daily Indications: 15 mg AM, 10mg afternoon, 15mg PM       CHOLINE PO Take 1 tablet by mouth daily      Cholecalciferol (VITAMIN D3) 2000 units TABS Take 1 tablet by mouth 2 times daily       Loratadine (CLARITIN) 10 MG CAPS Take by mouth      multivitamin (THERAGRAN) per tablet Take 1 tablet by mouth daily. No current facility-administered medications for this visit. Allergies   Allergen Reactions    Adhesive Tape      Steri strips     Biaxin [Clarithromycin]      Cannot take     Chlorhexidine     Nickel     Penicillins Hives    Sulfa Antibiotics Itching and Other (See Comments)     Look like bad sun burn    Iodine Rash     BLISTERS  BLISTERS         OBJECTIVE:   /80   Pulse 69   Resp 16   Ht 5' 5\" (1.651 m)   Wt 263 lb 6.4 oz (119.5 kg)   LMP 10/10/2018   BMI 43.83 kg/m²   Wt Readings from Last 3 Encounters:   03/14/22 263 lb 6.4 oz (119.5 kg)   09/13/21 255 lb (115.7 kg)   08/31/20 253 lb (114.8 kg)       Prolactin  Specimen:  Serum   Ref Range & Units 4 yr ago   Prolactin 2.7 - 19.6 ng/mL 16.84           Date: 07/25/17   Received From:  [facility]    Recent Data from [address] [place] [address] to Prolactin  Component 07/25/17 06/14/17 08/27/16 12/09/15   Prolactin 16.84 32. 98High  23. 23High  18.50       Physical Exam:    Constitutional: no acute distress, well appearing, well nourished  Psychiatric: oriented to person, place and time, judgement, insight and normal, recent and remote memory and intact and mood, affect are normal  Skin: skin and subcutaneous tissue is normal without mass,   Head and Face: examination of head and face revealed no abnormalities  Eyes: no lid or conjunctival swelling, no erythema or discharge, pupils are normal,   Ears/Nose: external inspection of ears and nose revealed no abnormalities, hearing is grossly normal  Oropharynx/Mouth/Face: lips, tongue and gums are normal with no lesions, the voice quality was normal  Neck: neck is supple and symmetric, with midline trachea and no masses, thyroid is normal    Pulmonary: no increased work of breathing or signs of respiratory distress, lungs are clear to auscultation  Cardiovascular: normal heart rate and rhythm, normal S1 and S2,   Musculoskeletal: normal gait and station,   Neurological: normal coordination, normal general cortical function    Lab Review:  Lab Results   Component Value Date    TSH 3.18 03/12/2022    TSH 3.19 09/08/2021    TSH 3.26 02/26/2021     Lab Results   Component Value Date    T4FREE 1.2 07/05/2019        ASSESSMENT/PLAN:    PITUITARY MICROADENOMA diagnosed in 1990.  -- Latest MRI in December 2021 was done in [place]  as she needed open MRI which showed the resolution of pituitary adenoma Or it was not vsisble on that imaging study--    Denies any headaches or blurred vision --prolactin 11.2 >>8.0 in sept 2021 >> prolactin 9.4 in March 2022  She has been on dostinex for years and her prolactin was slightly elevated in 2017-- dose of  dostinex increased to 0.5 mg twice weekly and prolactin has been normal since then , her neurologist recommended stopping the cabergoline patient is not interested in that we will likely continue with this the levels are in normal range I did tell patient that if we take her off of the cabergoline and in a month if the levels go back higher we will resume it but she does not want to do it at this point.     Thyroid hormone levels --are skewed -- thyroid antibodies were negative thyroid hormone levels are now in the normal range will continue to monitor     ----[facility] and  70 ,53 in April 2019    Cortisol 14>> 18 Acth 48---  ---24 hr urine cortisol Which was normal 30 in may 2016   IGF-1 levels was normal 81     Prediabetes  Previous A1c 6.1 >>6.3 >>5.8 >>5.9 >6.3>5.8>>6.4>>6.1>>5.9>>6.1>>6.3>>5.8>>6.0   Fasting glucose was 126, due to weight gain and worsening of glucose to control I will start her on GLP-1 agonist all possible side effect discussed with the patient prescription for Ozempic sent  Fasting glucose was 115 in 11/2019 >>117 in feb 2020>>134 augu 2020 ( she took a snack late at night )   she has been to diabetic educator in the past  We reinforced carb restriction   Microalbumin to creatinine ratio was normal     Postmenopausal  ---  DEXA scan was normal BMD in July 2019   She had Menarche at age 7   Menopause after hysterectomy in oct 2018   [facility] 62 in July 2019       Fatty liver   Elevated LFTS   Improved and not now normal in march 2022   Improved in February 2020, results were discussed in detail with the patient  Advised to discuss with PCP and work on losing weight     HYPERLIPIDEMIA    After she modified her diet, her LDL   deropped from 105>>86>>76>>90 >>92 >.81 in august 2020   Triglycerides 170 she has tried otc fish oil before with elevated TGs   --- Vascepa not approved by insurance so she is now taking over-the-counter fish oil again      OBESITY  Advised to count her calories and start exercise program-- discussed caloric goal and use of APPS like  [employer]     CTS --neurological symptoms   Follows with neurology    She did CTS surgery in dec 2019 and she has her  back now     Reviewed and/or ordered clinical lab results Yes  Reviewed and/or ordered radiology tests Yes   Reviewed and/or ordered other diagnostic tests Yes  Made a decision to obtain old records Yes  Reviewed and summarized old records Yes      David Albert was counseled regarding symptoms of current diagnosis, course and complications of disease if inadequately treated, side effects of medications, diagnosis, treatment options, and prognosis, risks, benefits, complications, and alternatives of treatment, labs, imaging and other studies and treatment targets and goals. She understands instructions and counseling. Return in about 3 months (around 6/14/2022).

## 2022-03-20 DIAGNOSIS — E22.1 HYPERPROLACTINEMIA (HCC): ICD-10-CM

## 2022-03-21 ENCOUNTER — TELEPHONE (OUTPATIENT)
Dept: ENDOCRINOLOGY | Age: 62
End: 2022-03-21

## 2022-03-21 RX ORDER — CABERGOLINE 0.5 MG/1
TABLET ORAL
Qty: 24 TABLET | Refills: 1 | Status: SHIPPED | OUTPATIENT
Start: 2022-03-21 | End: 2022-10-18

## 2022-03-21 NOTE — TELEPHONE ENCOUNTER
Patient saw Dr Bigg Amezquita last Monday and Ozempic was ordered. Her pharmacy told her it needed a PA. Per the pharmacist, the PA has been started but more information is needed? I don't see anything in her chart, could have been started through  [facility]. Please check on status and update patient. Respiratory

## 2022-03-23 NOTE — TELEPHONE ENCOUNTER
Fax from [address] [place] [address] denial for [address]    -we did not see what we need to approve the drug

## 2022-04-04 NOTE — TELEPHONE ENCOUNTER
Please advise pt that her insurance company doesn't cover Ozempic,  --- also for PA did we mention that it is for weight and prediabetes management

## 2022-04-05 NOTE — TELEPHONE ENCOUNTER
reSubmitted PA for Ozempic (0.25 or 0.5 MG/DOSE) 2MG/1.5ML pen-injectors  Key: [id number]  Via CMM STATUS: PENDING    Used the dx code e66.9

## 2022-04-11 ENCOUNTER — TELEPHONE (OUTPATIENT)
Dept: ENDOCRINOLOGY | Age: 62
End: 2022-04-11

## 2022-04-11 NOTE — TELEPHONE ENCOUNTER
Fax from [facility] w/ [address] for [address]    -we may be able to approve this drug in a certain situation (when the req drug will not be used for obesity) we do not see that this apples to you

## 2022-04-11 NOTE — TELEPHONE ENCOUNTER
Patient stated she stop . She started back going tho the gym working out daily. Patient wants to try diet and exercise before trying a new medication.

## 2022-10-15 DIAGNOSIS — R73.03 PREDIABETES: ICD-10-CM

## 2022-10-15 DIAGNOSIS — E22.1 HYPERPROLACTINEMIA (HCC): ICD-10-CM

## 2022-10-15 LAB
A/G RATIO: 2 (ref 1.1–2.2)
ALBUMIN SERPL-MCNC: 4.6 G/DL (ref 3.4–5)
ALP BLD-CCNC: 67 U/L (ref 40–129)
ALT SERPL-CCNC: 42 U/L (ref 10–40)
ANION GAP SERPL CALCULATED.3IONS-SCNC: 13 MMOL/L (ref 3–16)
AST SERPL-CCNC: 41 U/L (ref 15–37)
BILIRUB SERPL-MCNC: 0.6 MG/DL (ref 0–1)
BUN BLDV-MCNC: 10 MG/DL (ref 7–20)
CALCIUM SERPL-MCNC: 9.2 MG/DL (ref 8.3–10.6)
CHLORIDE BLD-SCNC: 100 MMOL/L (ref 99–110)
CHOLESTEROL, TOTAL: 148 MG/DL (ref 0–199)
CO2: 27 MMOL/L (ref 21–32)
CREAT SERPL-MCNC: 0.5 MG/DL (ref 0.6–1.2)
GFR AFRICAN AMERICAN: >60
GFR NON-AFRICAN AMERICAN: >60
GLUCOSE BLD-MCNC: 123 MG/DL (ref 70–99)
HDLC SERPL-MCNC: 35 MG/DL (ref 40–60)
LDL CHOLESTEROL CALCULATED: 80 MG/DL
POTASSIUM SERPL-SCNC: 4.2 MMOL/L (ref 3.5–5.1)
PROLACTIN: 8.2 NG/ML
SODIUM BLD-SCNC: 140 MMOL/L (ref 136–145)
TOTAL PROTEIN: 6.9 G/DL (ref 6.4–8.2)
TRIGL SERPL-MCNC: 163 MG/DL (ref 0–150)
TSH SERPL DL<=0.05 MIU/L-ACNC: 3.03 UIU/ML (ref 0.27–4.2)
VITAMIN D 25-HYDROXY: 61.4 NG/ML
VLDLC SERPL CALC-MCNC: 33 MG/DL

## 2022-10-16 DIAGNOSIS — E22.1 HYPERPROLACTINEMIA (HCC): ICD-10-CM

## 2022-10-16 LAB
ESTIMATED AVERAGE GLUCOSE: 119.8 MG/DL
HBA1C MFR BLD: 5.8 %

## 2022-10-17 ENCOUNTER — OFFICE VISIT (OUTPATIENT)
Dept: ENDOCRINOLOGY | Age: 62
End: 2022-10-17
Payer: COMMERCIAL

## 2022-10-17 ENCOUNTER — TELEPHONE (OUTPATIENT)
Dept: ENDOCRINOLOGY | Age: 62
End: 2022-10-17

## 2022-10-17 VITALS
SYSTOLIC BLOOD PRESSURE: 122 MMHG | BODY MASS INDEX: 43.72 KG/M2 | HEART RATE: 68 BPM | DIASTOLIC BLOOD PRESSURE: 79 MMHG | HEIGHT: 65 IN | OXYGEN SATURATION: 98 % | WEIGHT: 262.4 LBS

## 2022-10-17 DIAGNOSIS — R73.03 PREDIABETES: Primary | ICD-10-CM

## 2022-10-17 DIAGNOSIS — E78.2 MIXED HYPERLIPIDEMIA: ICD-10-CM

## 2022-10-17 DIAGNOSIS — E66.9 OBESITY (BMI 35.0-39.9 WITHOUT COMORBIDITY): ICD-10-CM

## 2022-10-17 DIAGNOSIS — E22.1 HYPERPROLACTINEMIA (HCC): ICD-10-CM

## 2022-10-17 PROCEDURE — 99214 OFFICE O/P EST MOD 30 MIN: CPT | Performed by: INTERNAL MEDICINE

## 2022-10-17 RX ORDER — DULAGLUTIDE 0.75 MG/.5ML
0.75 INJECTION, SOLUTION SUBCUTANEOUS WEEKLY
Qty: 4 ADJUSTABLE DOSE PRE-FILLED PEN SYRINGE | Refills: 3 | Status: SHIPPED | OUTPATIENT
Start: 2022-10-17

## 2022-10-17 NOTE — PROGRESS NOTES
SUBJECTIVE:  Amber Trinidad is a 65 y.o. female  seen for hyperprolactinemia, pituitary microadenoma which has resolved on imaging, prediabetes and hyperlipidemia pt was diagnosed with Pituitary microadenoma in 1995 at the time she was having issues with PCOS --she was having headaches and galactorrhea at the time she was started on Parlodel which she took for 3 years and she had repeat MRI which didn't show any pituitary microadenoma . She was later switched to Dostinex which she has continued up until now which has been prescribed by her ob/gyn   --In 2014 she started having neurological complaints like numbness and tingling in her hands , diagnosed with CTS bilateraly during her workup she had MRI brain done which again showed pituitary ADENOMA  She also has DJD in cervical spine   According to the patient she was size 4 up until college and then in her 30's she started with ovarian issues and gained a lot of wt , Acne  since age 37 she continued to have menstrual periods until she had her hysterectomy in October 2018 at the age of 63   She had rt knee replacement in sept 2016  -- June 2018 she underwent uterine fibroid removal as well as uterine ablation in June 2018   --She had a complete hysterectomy on oct  2018    ---CTS surgery in dec 2019 Rt hand ---symptoms improved       INTERIM   She has diarrhea and is scheduled for colonoscopy --so probably would not be a good candidate to try metformin for her prediabetes/diabetic range fasting glucose. She still does not have 2 fasting glucose above 125 but most of her fasting glucose are around 120 or above range   She has been taking her Dostinex regularly denies any nipple discharge denies any headache or blurred vision. She underwent MRI of her spine and brain, pituitary imaging was normal.      ROS  I have reviewed the review of system questionnaire filled by the patient .   Patient was advised to contact PCP for non endocrine signs and symptoms '    Past Medical History:   Diagnosis Date    Arthritis     osteoarthritis    Cellulitis 10/2018    abdomen    Cyst of fallopian tube 2018    bilat     H/O bronchitis     Pituitary microadenoma (HCC)     Pituitary microadenoma ([address] 10/11/2018    Prediabetes     Sleep apnea     PT DOES NOT USE HER CPAP MACHINE    Torn meniscus 2018    L      Patient Active Problem List    Diagnosis Date Noted    Prediabetes 03/03/2021    Hyperprolactinemia ([address] 03/03/2021    Obesity (BMI 35.0-39.9 without comorbidity) 02/24/2020    Mixed hyperlipidemia 02/24/2020    Postmenopausal 02/24/2020    Pituitary microadenoma ([address] 10/11/2018    Left breast mass 10/21/2015    Hearing loss 05/07/2014    Allergic rhinitis, seasonal 05/07/2014    Sialadenitis 12/10/2013    Low back pain 04/29/2013    Neck pain 04/29/2013     Past Surgical History:   Procedure Laterality Date    BLADDER SURGERY      bladder sling    BREAST BIOPSY Left 10/21/2015    LEFT BREAST BIOPSY                        COLONOSCOPY      DILATION AND CURETTAGE OF UTERUS      HIATAL HERNIA REPAIR  1997    HYSTERECTOMY (CERVIX STATUS UNKNOWN)  10/26/2018    KNEE CARTILAGE SURGERY  2008    KNEE SURGERY Left 12/17/2018    MOUTH SURGERY  2017    skin cancer lip     RHINOPLASTY  1981    UTERINE FIBROID SURGERY       Family History   Problem Relation Age of Onset    Heart Disease Mother     Hypertension Mother     Diabetes Father         pre    Cancer Father     Emphysema Father     Stroke Father 48    Other Sister         hypothyroid     Other Sister         long COVID ?     Other Brother         colon polyps    Diabetes Maternal Aunt         pre    Stroke Maternal Grandmother     Stroke Maternal Grandfather     Emphysema Paternal Grandmother     Cancer Other     Heart Disease Other     Diabetes Other     High Blood Pressure Other     Stroke Other      Social History     Socioeconomic History    Marital status:      Spouse name: None    Number of children: 2    Years of education: None    Highest education level: None   Tobacco Use    Smoking status: Former     Packs/day: 1.00     Years: 10.00     Pack years: 10.00     Types: Cigarettes     Quit date: 3/1/1991     Years since quittin.6    Smokeless tobacco: Never   Vaping Use    Vaping Use: Never used   Substance and Sexual Activity    Alcohol use: Yes     Comment: rarely    Drug use: No     Current Outpatient Medications   Medication Sig Dispense Refill    Dulaglutide (TRULICITY) 5.69 EE/2.4QO SOPN Inject 0.75 mg into the skin once a week 4 Adjustable Dose Pre-filled Pen Syringe 3    cabergoline (DOSTINEX) 0.5 MG tablet TAKE 1 TABLET BY MOUTH TWICE WEEKLY 24 tablet 1    Lactobacillus Rhamnosus, GG, ( PROBIOTIC DIGESTIVE CARE) CAPS Take 1 capsule by mouth daily      Fluticasone Propionate (FLONASE NA) by Nasal route 2 times daily       baclofen (LIORESAL) 10 MG tablet Take 10 mg by mouth 4 times daily Indications: 15 mg AM, 10mg afternoon, 15mg PM       CHOLINE PO Take 1 tablet by mouth daily      Cholecalciferol (VITAMIN D3) 2000 units TABS Take 1 tablet by mouth 2 times daily       loratadine (CLARITIN) 10 MG capsule Take by mouth      multivitamin (THERAGRAN) per tablet Take 1 tablet by mouth daily. No current facility-administered medications for this visit.      Allergies   Allergen Reactions    Adhesive Tape      Steri strips     Biaxin [Clarithromycin]      Cannot take     Chlorhexidine     Nickel     Penicillins Hives    Sulfa Antibiotics Itching and Other (See Comments)     Look like bad sun burn    Iodine Rash     BLISTERS  BLISTERS         OBJECTIVE:   /79 (Site: Right Upper Arm, Position: Sitting, Cuff Size: Large Adult)   Pulse 68   Ht 5' 5\" (1.651 m)   Wt 262 lb 6.4 oz (119 kg)   LMP 10/10/2018   SpO2 98%   BMI 43.67 kg/m²   Wt Readings from Last 3 Encounters:   10/17/22 262 lb 6.4 oz (119 kg)   22 263 lb 6.4 oz (119.5 kg)   21 255 lb (115.7 kg)       Prolactin  Specimen:  Serum   Ref Range & Units 4 yr ago   Prolactin 2.7 - 19.6 ng/mL 16.84           Date: 07/25/17   Received From: Mary Rajput from [address] [place] [address] to Prolactin  Component 07/25/17 06/14/17 08/27/16 12/09/15   Prolactin 16.84 32. 98High  23. 23High  18.50       Physical Exam:    Constitutional: no acute distress, well appearing, well nourished  Psychiatric: oriented to person, place and time, judgement, insight and normal, recent and remote memory and intact and mood, affect are normal  Skin: skin and subcutaneous tissue is normal without mass,   Head and Face: examination of head and face revealed no abnormalities  Eyes: no lid or conjunctival swelling, no erythema or discharge, pupils are normal,   Ears/Nose: external inspection of ears and nose revealed no abnormalities, hearing is grossly normal  Oropharynx/Mouth/Face: lips, tongue and gums are normal with no lesions, the voice quality was normal  Neck: neck is supple and symmetric, with midline trachea and no masses, thyroid is normal    Pulmonary: no increased work of breathing or signs of respiratory distress, lungs are clear to auscultation  Cardiovascular: normal heart rate and rhythm, normal S1 and S2,   Musculoskeletal: normal gait and station,   Neurological: normal coordination, normal general cortical function    Lab Review:  Lab Results   Component Value Date/Time    TSH 3.03 10/15/2022 09:23 AM    TSH 3.18 03/12/2022 08:49 AM    TSH 3.19 09/08/2021 08:30 AM     Lab Results   Component Value Date/Time    T4FREE 1.2 07/05/2019 09:53 AM        ASSESSMENT/PLAN:    Prediabetes  Previous A1c 6.1 >>6.3 >>5.8 >>5.9 >6.3>5.8>>6.4>>6.1>>5.9>>6.1>>6.3>>5.8>>6.0   A1c has improved to 5.8 in October 2022.   Fasting glucose was 123  Fasting glucose was 126, due to weight gain and worsening of glucose to control I will start her on GLP-1 agonist all possible side effect discussed with the patient prescription for Ozempic sent  Fasting glucose was 123 in oct 2022   --start trulicity all possible side effect discussed with the patient prescription sent to the pharmacy we can also try metformin if Trulicity does not get approved but concerned about diarrhea which has been an ongoing problem she has already been evaluated for the diarrhea by having a colonoscopy done  --cant take Metformin due to diarrhea    she has been to diabetic educator in the past  We reinforced carb restriction   Microalbumin to creatinine ratio was normal         OBESITY  Advised to count her calories and start exercise program-- discussed caloric goal and use of APPS like  my [facility] . I did discuss weight loss medication in detail with the patient all possible side effects from Qsymia were also discussed with the patient and if Trulicity does not get approved we will try to go for Qsymia    Patient has not been able to lose weight despite life style changes. She  is interested in pursuing pharmacological treatment options for obesity management. We discussed various available FDA approved options. She  is interested in Qsymia. I had a detailed discussion about the rationale of use, mechanism of action, contraindications and adverse effects of Qsymia. I have also provided the patient with written narrative of side effects, drug interactions and contraindications. Felipe has no apparent contraindication to use of Qsymia including absence of any hypersensitivity to drug, glaucoma, recent use of MAO inhibitor therapy, hyperthyroidism or pregnancy. . She was told to watch for any worsening depression, tachycardia, insomnia, anxiety, palpitations, or hyperthermia. Increased incidence of kidney stones, hypokalemia and metabolic acidosis was also explained. Patient was instructed the importance of close follow-up as instructed. Patient understands that life style changes should be implemented and continued along with pharmacological therapy to ensure maximum benefit.  Patient was advised on portion control, low carb choices and caloric restriction. My recommendation is to ensure a 500 calorie deficit as compared to baseline caloric intake. Patient was instructed the importance of close follow-up as instructed. I also recommend ensuring moderate intensity exercise of 60 minutes per day. PITUITARY MICROADENOMA diagnosed in 1990.  -- Latest MRI in December 2021 was done in [place]  as she needed open MRI which showed the resolution of pituitary adenoma Or it was not vsisble on that imaging study--    Denies any headaches or blurred vision --prolactin 11.2 >>8.0 in sept 2021 >> prolactin 9.4 in March 2022 prolactin level was 8.2 in October 2022  She has been on dostinex for years and her prolactin was slightly elevated in 2017-- dose of  dostinex increased to 0.5 mg twice weekly and prolactin has been normal since then , her neurologist recommended stopping the cabergoline patient is not interested in that we will likely continue with this the levels are in normal range I did tell patient that if we take her off of the cabergoline and in a month if the levels go back higher we will resume it but she does not want to do it at this point.     Thyroid hormone levels --are skewed -- thyroid antibodies were negative thyroid hormone levels are now in the normal range will continue to monitor     ----[facility] and LH 69 ,52 in April 2019    Cortisol 14>> 18 Acth 48---  ---24 hr urine cortisol Which was normal 30 in may 2016   IGF-1 levels was normal 81       Postmenopausal  ---  DEXA scan was normal BMD in July 2019   She had Menarche at age 7   Menopause after hysterectomy in oct 2018   [facility] 61 in July 2019       Fatty liver   Elevated LFTS   Improved and not now normal in march 2022   Improved in February 2020, results were discussed in detail with the patient  Advised to discuss with PCP and work on losing weight     HYPERLIPIDEMIA    After she modified her diet, her LDL   deropped from 105>>86>>76>>90 >>92 >.81 in august 2020   Latest total cholesterol 148, LDL 80, triglycerides 163. Not on statins. --- Vascepa not approved by insurance so she is now taking over-the-counter fish oil again      CTS --neurological symptoms   Follows with neurology    She did CTS surgery in dec 2019 and she has her  back now     Reviewed and/or ordered clinical lab results Yes  Reviewed and/or ordered radiology tests Yes   Reviewed and/or ordered other diagnostic tests Yes  Made a decision to obtain old records Yes  Reviewed and summarized old records Yes      Ya Moscoso was counseled regarding symptoms of current diagnosis, course and complications of disease if inadequately treated, side effects of medications, diagnosis, treatment options, and prognosis, risks, benefits, complications, and alternatives of treatment, labs, imaging and other studies and treatment targets and goals. She understands instructions and counseling. Return in about 3 months (around 1/17/2023).

## 2022-10-18 RX ORDER — CABERGOLINE 0.5 MG/1
TABLET ORAL
Qty: 24 TABLET | Refills: 1 | Status: SHIPPED | OUTPATIENT
Start: 2022-10-18

## 2022-10-18 NOTE — TELEPHONE ENCOUNTER
Fax from [facility]    Prior [place] has been started for Trulicity . 75/.5

## 2022-10-18 NOTE — TELEPHONE ENCOUNTER
Fax from Valerie Guevara    Denial letter for [place] [address] not able to approve in certain situations for treatment of Prediabetes

## 2023-04-03 DIAGNOSIS — E22.1 HYPERPROLACTINEMIA (HCC): ICD-10-CM

## 2023-04-03 RX ORDER — CABERGOLINE 0.5 MG/1
TABLET ORAL
Qty: 24 TABLET | Refills: 1 | Status: SHIPPED | OUTPATIENT
Start: 2023-04-03

## 2023-06-23 DIAGNOSIS — E22.1 HYPERPROLACTINEMIA (HCC): ICD-10-CM

## 2023-06-23 DIAGNOSIS — E78.2 MIXED HYPERLIPIDEMIA: ICD-10-CM

## 2023-06-23 DIAGNOSIS — E66.9 OBESITY (BMI 35.0-39.9 WITHOUT COMORBIDITY): ICD-10-CM

## 2023-06-23 DIAGNOSIS — R73.03 PREDIABETES: ICD-10-CM

## 2023-06-23 LAB
25(OH)D3 SERPL-MCNC: 69.8 NG/ML
ALBUMIN SERPL-MCNC: 4.9 G/DL (ref 3.4–5)
ALBUMIN/GLOB SERPL: 1.7 {RATIO} (ref 1.1–2.2)
ALP SERPL-CCNC: 78 U/L (ref 40–129)
ALT SERPL-CCNC: 46 U/L (ref 10–40)
ANION GAP SERPL CALCULATED.3IONS-SCNC: 12 MMOL/L (ref 3–16)
AST SERPL-CCNC: 41 U/L (ref 15–37)
BILIRUB SERPL-MCNC: 0.7 MG/DL (ref 0–1)
BUN SERPL-MCNC: 12 MG/DL (ref 7–20)
CALCIUM SERPL-MCNC: 9.5 MG/DL (ref 8.3–10.6)
CHLORIDE SERPL-SCNC: 100 MMOL/L (ref 99–110)
CHOLEST SERPL-MCNC: 154 MG/DL (ref 0–199)
CO2 SERPL-SCNC: 29 MMOL/L (ref 21–32)
CREAT SERPL-MCNC: 0.6 MG/DL (ref 0.6–1.2)
GFR SERPLBLD CREATININE-BSD FMLA CKD-EPI: >60 ML/MIN/{1.73_M2}
GLUCOSE SERPL-MCNC: 127 MG/DL (ref 70–99)
HDLC SERPL-MCNC: 39 MG/DL (ref 40–60)
LDLC SERPL CALC-MCNC: 91 MG/DL
POTASSIUM SERPL-SCNC: 4.3 MMOL/L (ref 3.5–5.1)
PROT SERPL-MCNC: 7.8 G/DL (ref 6.4–8.2)
SODIUM SERPL-SCNC: 141 MMOL/L (ref 136–145)
TRIGL SERPL-MCNC: 122 MG/DL (ref 0–150)
TSH SERPL DL<=0.005 MIU/L-ACNC: 2.79 UIU/ML (ref 0.27–4.2)
VLDLC SERPL CALC-MCNC: 24 MG/DL

## 2023-06-24 LAB
EST. AVERAGE GLUCOSE BLD GHB EST-MCNC: 116.9 MG/DL
HBA1C MFR BLD: 5.7 %

## 2023-06-26 ENCOUNTER — OFFICE VISIT (OUTPATIENT)
Dept: ENDOCRINOLOGY | Age: 63
End: 2023-06-26
Payer: COMMERCIAL

## 2023-06-26 VITALS
HEIGHT: 65 IN | TEMPERATURE: 98 F | BODY MASS INDEX: 43.32 KG/M2 | HEART RATE: 88 BPM | WEIGHT: 260 LBS | SYSTOLIC BLOOD PRESSURE: 128 MMHG | DIASTOLIC BLOOD PRESSURE: 78 MMHG | RESPIRATION RATE: 14 BRPM

## 2023-06-26 DIAGNOSIS — E22.1 HYPERPROLACTINEMIA (HCC): ICD-10-CM

## 2023-06-26 DIAGNOSIS — E66.9 OBESITY (BMI 35.0-39.9 WITHOUT COMORBIDITY): ICD-10-CM

## 2023-06-26 DIAGNOSIS — D35.2 PITUITARY MICROADENOMA (HCC): ICD-10-CM

## 2023-06-26 DIAGNOSIS — E11.9 CONTROLLED TYPE 2 DIABETES MELLITUS WITHOUT COMPLICATION, WITHOUT LONG-TERM CURRENT USE OF INSULIN (HCC): Primary | ICD-10-CM

## 2023-06-26 PROCEDURE — 3044F HG A1C LEVEL LT 7.0%: CPT | Performed by: INTERNAL MEDICINE

## 2023-06-26 PROCEDURE — 99214 OFFICE O/P EST MOD 30 MIN: CPT | Performed by: INTERNAL MEDICINE

## 2023-06-26 RX ORDER — CABERGOLINE 0.5 MG/1
TABLET ORAL
Qty: 24 TABLET | Refills: 1 | Status: SHIPPED | OUTPATIENT
Start: 2023-06-26

## 2023-07-03 ENCOUNTER — TELEPHONE (OUTPATIENT)
Dept: ENDOCRINOLOGY | Age: 63
End: 2023-07-03

## 2023-07-03 NOTE — TELEPHONE ENCOUNTER
Patient states that she spoke to her liver specialist and Oncology/Hematology doctors and they are all okay with medication she is currently being prescribed for her diabetes. Patient's pharmacy informed her a PA was needed for her Ozempic. Routing to MD as Zoila Carmona and PE department.

## 2023-07-07 NOTE — TELEPHONE ENCOUNTER
Submitted PA for ozempic  Via [facility] (Key: [id number] STATUS: PENDING. Follow up done daily; if no response in three days we will refax for status check. If another three days goes by with no response we will call the insurance for status.

## 2023-08-02 ENCOUNTER — TELEPHONE (OUTPATIENT)
Dept: ENDOCRINOLOGY | Age: 63
End: 2023-08-02

## 2023-08-02 NOTE — TELEPHONE ENCOUNTER
Returned patients call and advised that we have submitted an appeal to her insurance and are just waiting on the response. Patient verbalized understanding.

## 2023-08-03 ENCOUNTER — OFFICE VISIT (OUTPATIENT)
Dept: ENDOCRINOLOGY | Age: 63
End: 2023-08-03

## 2023-08-03 DIAGNOSIS — E11.9 CONTROLLED TYPE 2 DIABETES MELLITUS WITHOUT COMPLICATION, WITHOUT LONG-TERM CURRENT USE OF INSULIN (HCC): Primary | ICD-10-CM

## 2023-08-03 NOTE — PROGRESS NOTES
Medical Nutrition Therapy for Diabetes  [facility]) Endocrinology    Sara Coe  August 3, 2023    Patient Care Team:  Jakob Stack as PCP - Lachelle Acevedo MD as Consulting Physician (Otolaryngology)    Reason for visit: 1. Controlled type 2 diabetes mellitus without complication, without long-term current use of insulin (HCC) [E11.9 (ICD-10-CM)]     Initial     ASSESSMENT/PLAN:   NUTRITION DIAGNOSIS    #1 Problem: Altered Nutrition-Related Laboratory Values (NC-2.2)  Related to: Endocrine/Diabetes   As Evidenced by: Elevated Plasma glucose and/or HgbA1c levels         #2 Problem: Inconsistent Carbohydrate Intake (NI 5.8.4)  Related to: Varied meal timing / incorrect carbohydrate counting  As Evidenced by: fluctuation in blood glucose levels / food recall    #3 Problem: Inadequate Carbohydrate Intake  Related to: food and nutrition knowledge deficit regarding controlling blood glucose  As evidenced by: food recall with less than 30 g carbohydrate per meal    NUTRITION INTERVENTION  Nutrition Prescription: 30 grams carbohydrate per meal with protein and non-starch vegetables  15 gram carbohydrate snacks     Diabetes Education/Counseling included:  Carbohydrate control  Activity/Exercise  Label reading  Monitoring  Medication Review  Reviewed proper medication timing  Explained small efforts can promote improved health results  Benefit of eating protein with each meal/snack reviewed  Reviewed benefits of purchasing regular foods vs specialty food items (ex.sugar-free)  Benefits of adequate hydration, quality sleep and stress management  Explained HgbA1c ranges, reviewed normal/at risk for diabetes/and diabetes diagnosis ranges.       Handouts Provided:  Checking Your Blood Glucose- [address] Diabetes and Education Handouts- [facility] Diabetes  Planning Healthy Meals- NovoNordisk  Pathophysiology of Diabetes reviewed  Sample Menu- Zeer    Interventions:  Control Carbohydrate Intake

## 2023-11-02 ENCOUNTER — OFFICE VISIT (OUTPATIENT)
Dept: ENDOCRINOLOGY | Age: 63
End: 2023-11-02

## 2023-11-02 DIAGNOSIS — E11.9 CONTROLLED TYPE 2 DIABETES MELLITUS WITHOUT COMPLICATION, WITHOUT LONG-TERM CURRENT USE OF INSULIN (HCC): Primary | ICD-10-CM

## 2023-11-02 NOTE — PROGRESS NOTES
Medical Nutrition Therapy for Diabetes  [facility]) Endocrinology    Claudean Cargo  November 2, 2023    Patient Care Team:  Abdoulaye Ventura MD as PCP - General  Iggy Guzman MD as Consulting Physician (Otolaryngology)    Reason for visit: 1. Controlled type 2 diabetes mellitus without complication, without long-term current use of insulin ([facility])       Follow up    ASSESSMENT/PLAN:   NUTRITION DIAGNOSIS    #1 Problem: Altered Nutrition-Related Laboratory Values (NC-2.2)  Related to: Endocrine/Diabetes   As Evidenced by: Elevated Plasma glucose and/or HgbA1c levels         #2 Problem: Inconsistent Carbohydrate Intake (NI 5.8.4)  Related to: Varied meal timing / incorrect carbohydrate counting  As Evidenced by: fluctuation in blood glucose levels / food recall    #3 Problem: Inadequate Carbohydrate Intake  Related to: food and nutrition knowledge deficit regarding controlling blood glucose  As evidenced by: food recall with less than 30 g carbohydrate per meal    NUTRITION INTERVENTION  Nutrition Prescription: 30 grams carbohydrate per meal with protein and non-starch vegetables  15 gram carbohydrate snacks     Diabetes Education/Counseling included:  Pt voiced frustration with not losing weight, encouraged focusing on one goal at a time, goals discussed of increasing exercise weekly, including more vegetables, and journal foods. Pt states she would like to focus on journaling foods.    E  Activity/Exercise  Label reading  Monitoring  Medication Review  Reviewed proper medication timing  Explained small efforts can promote improved health results  Benefit of eating protein with each meal/snack reviewed  Reviewed benefits of purchasing regular foods vs specialty food items (ex.sugar-free)  Benefits of adequate hydration, quality sleep and stress management    Handouts Provided:    Planning Healthy Meals- NovoNordisk    Interventions:  Control Carbohydrate Intake using Carb Counting  Decrease intake of high sodium

## 2023-11-09 ENCOUNTER — TELEPHONE (OUTPATIENT)
Dept: ENDOCRINOLOGY | Age: 63
End: 2023-11-09

## 2024-01-19 DIAGNOSIS — E22.1 HYPERPROLACTINEMIA (HCC): ICD-10-CM

## 2024-01-19 DIAGNOSIS — E66.9 OBESITY (BMI 35.0-39.9 WITHOUT COMORBIDITY): ICD-10-CM

## 2024-01-19 DIAGNOSIS — E11.9 CONTROLLED TYPE 2 DIABETES MELLITUS WITHOUT COMPLICATION, WITHOUT LONG-TERM CURRENT USE OF INSULIN (HCC): ICD-10-CM

## 2024-01-19 DIAGNOSIS — D35.2 PITUITARY MICROADENOMA (HCC): ICD-10-CM

## 2024-01-19 LAB
25(OH)D3 SERPL-MCNC: 65.3 NG/ML
ALBUMIN SERPL-MCNC: 4.5 G/DL (ref 3.4–5)
ALBUMIN/GLOB SERPL: 1.5 {RATIO} (ref 1.1–2.2)
ALP SERPL-CCNC: 88 U/L (ref 40–129)
ALT SERPL-CCNC: 39 U/L (ref 10–40)
ANION GAP SERPL CALCULATED.3IONS-SCNC: 11 MMOL/L (ref 3–16)
AST SERPL-CCNC: 51 U/L (ref 15–37)
BILIRUB SERPL-MCNC: 0.8 MG/DL (ref 0–1)
BUN SERPL-MCNC: 8 MG/DL (ref 7–20)
CALCIUM SERPL-MCNC: 9.1 MG/DL (ref 8.3–10.6)
CHLORIDE SERPL-SCNC: 98 MMOL/L (ref 99–110)
CHOLEST SERPL-MCNC: 131 MG/DL (ref 0–199)
CO2 SERPL-SCNC: 30 MMOL/L (ref 21–32)
CREAT SERPL-MCNC: 0.6 MG/DL (ref 0.6–1.2)
GFR SERPLBLD CREATININE-BSD FMLA CKD-EPI: >60 ML/MIN/{1.73_M2}
GLUCOSE SERPL-MCNC: 138 MG/DL (ref 70–99)
HDLC SERPL-MCNC: 33 MG/DL (ref 40–60)
LDLC SERPL CALC-MCNC: 71 MG/DL
POTASSIUM SERPL-SCNC: 4.1 MMOL/L (ref 3.5–5.1)
PROLACTIN SERPL IA-MCNC: 7.5 NG/ML
PROT SERPL-MCNC: 7.6 G/DL (ref 6.4–8.2)
SODIUM SERPL-SCNC: 139 MMOL/L (ref 136–145)
TRIGL SERPL-MCNC: 137 MG/DL (ref 0–150)
TSH SERPL DL<=0.005 MIU/L-ACNC: 2.68 UIU/ML (ref 0.27–4.2)
VLDLC SERPL CALC-MCNC: 27 MG/DL

## 2024-01-20 LAB
EST. AVERAGE GLUCOSE BLD GHB EST-MCNC: 142.7 MG/DL
HBA1C MFR BLD: 6.6 %

## 2024-01-22 ENCOUNTER — OFFICE VISIT (OUTPATIENT)
Dept: ENDOCRINOLOGY | Age: 64
End: 2024-01-22
Payer: COMMERCIAL

## 2024-01-22 VITALS
HEIGHT: 65 IN | SYSTOLIC BLOOD PRESSURE: 120 MMHG | DIASTOLIC BLOOD PRESSURE: 76 MMHG | RESPIRATION RATE: 16 BRPM | BODY MASS INDEX: 44.42 KG/M2 | HEART RATE: 93 BPM | WEIGHT: 266.6 LBS

## 2024-01-22 DIAGNOSIS — E11.9 CONTROLLED TYPE 2 DIABETES MELLITUS WITHOUT COMPLICATION, WITHOUT LONG-TERM CURRENT USE OF INSULIN (HCC): Primary | ICD-10-CM

## 2024-01-22 DIAGNOSIS — E22.1 HYPERPROLACTINEMIA (HCC): ICD-10-CM

## 2024-01-22 DIAGNOSIS — D35.2 PITUITARY MICROADENOMA (HCC): ICD-10-CM

## 2024-01-22 DIAGNOSIS — E78.2 MIXED HYPERLIPIDEMIA: ICD-10-CM

## 2024-01-22 PROCEDURE — 99214 OFFICE O/P EST MOD 30 MIN: CPT | Performed by: INTERNAL MEDICINE

## 2024-01-22 PROCEDURE — 3044F HG A1C LEVEL LT 7.0%: CPT | Performed by: INTERNAL MEDICINE

## 2024-01-22 RX ORDER — METFORMIN HYDROCHLORIDE 500 MG/1
500 TABLET, EXTENDED RELEASE ORAL
Qty: 90 TABLET | Refills: 1 | Status: SHIPPED | OUTPATIENT
Start: 2024-01-22

## 2024-01-22 NOTE — PATIENT INSTRUCTIONS
tired, or very weak;  stomach pain, vomiting; or  slow or irregular heart rate.  Common side effects may include:  low blood sugar;  nausea, upset stomach; or  diarrhea.  This is not a complete list of side effects and others may occur. Call your doctor for medical advice about side effects. You may report side effects to FDA at 7-729-JEX-6691.  What other drugs will affect metformin?  Many drugs can affect metformin, making this medicine less effective or increasing your risk of lactic acidosis. This includes prescription and over-the-counter medicines, vitamins, and herbal products. Not all possible interactions are listed here. Tell your doctor about all your current medicines and any medicine you start or stop using.  Where can I get more information?  Your pharmacist can provide more information about metformin.  Remember, keep this and all other medicines out of the reach of children, never share your medicines with others, and use this medication only for the indication prescribed.   Every effort has been made to ensure that the information provided by [employer] ('[employer]') is accurate, up-to-date, and complete, but no guarantee is made to that effect. Drug information contained herein may be time sensitive. [employer] information has been compiled for use by healthcare practitioners and consumers in the [place] and therefore [employer] does not warrant that uses outside of the [place] are appropriate, unless specifically indicated otherwise. [employer] drug information does not endorse drugs, diagnose patients or recommend therapy. [employer] drug information is an informational resource designed to assist licensed healthcare practitioners in caring for their patients and/or to serve consumers viewing this service as a supplement to, and not a substitute for, the expertise, skill, knowledge and judgment of healthcare practitioners. The absence of a warning for a given drug or drug combination in no

## 2024-01-22 NOTE — PROGRESS NOTES
2022 prolactin level was 8.2 in October 2022  She has been on dostinex for years and her prolactin was slightly elevated in 2017-- dose of  dostinex increased to 0.5 mg twice weekly and prolactin has been normal since then , her neurologist recommended stopping the cabergoline patient is not interested in that we will likely continue with this the levels are in normal range I did tell patient that if we take her off of the cabergoline and in a month if the levels go back higher we will resume it but she does not want to do it at this point.    Thyroid hormone levels --are skewed -- thyroid antibodies were negative thyroid hormone levels are now in the normal range will continue to monitor     ----FSH and LH 69 ,52 in April 2019    Cortisol 14>> 18 Acth 48---  ---24 hr urine cortisol Which was normal 30 in may 2016   IGF-1 levels was normal 81       Postmenopausal  ---  DEXA scan was normal BMD in July 2019   She had Menarche at age 12   Menopause after hysterectomy in oct 2018   FSH 61 in July 2019       Fatty liver   Elevated LFTS   Now follows with GI as well as pancreatic surgeon for pancreatic incidental cyst identified on an imaging.  MOY Glaser GLP-1 agonist use     HYPERLIPIDEMIA    After she modified her diet, her LDL   deropped from 105>>86>>76>>90 >>92 >.81 in august 2020   Latest total cholesterol 148, LDL 80, triglycerides 163.  Not on statins.  --- Vascepa not approved by insurance so she is now taking over-the-counter fish oil again      CTS --neurological symptoms   Follows with neurology    She did CTS surgery in dec 2019 and she has her  back now     Reviewed and/or ordered clinical lab results Yes  Reviewed and/or ordered radiology tests Yes   Reviewed and/or ordered other diagnostic tests Yes  Made a decision to obtain old records Yes  Reviewed and summarized old records Yes      Coby Prince was counseled regarding symptoms of current diagnosis, course and complications of disease if

## 2024-01-23 DIAGNOSIS — E22.1 HYPERPROLACTINEMIA (HCC): ICD-10-CM

## 2024-01-23 RX ORDER — CABERGOLINE 0.5 MG/1
TABLET ORAL
Qty: 24 TABLET | Refills: 0 | Status: SHIPPED | OUTPATIENT
Start: 2024-01-23

## 2024-01-29 ENCOUNTER — TELEPHONE (OUTPATIENT)
Dept: ENDOCRINOLOGY | Age: 64
End: 2024-01-29

## 2024-01-29 DIAGNOSIS — E11.9 CONTROLLED TYPE 2 DIABETES MELLITUS WITHOUT COMPLICATION, WITHOUT LONG-TERM CURRENT USE OF INSULIN (HCC): Primary | ICD-10-CM

## 2024-01-29 NOTE — TELEPHONE ENCOUNTER
Please advise patient that she can stop the metformin and she can continue with the Mounjaro if her insurance has approved it and if not can we do a PA to get it approved.

## 2024-01-29 NOTE — TELEPHONE ENCOUNTER
Call from pt stating that since starting Metformin she has been experiencing nausea and diarrhea     Pt is wanting to know what Dr. Cannon would suggest she do regarding her Metformin     Please advise   CB# [phone]

## 2024-01-31 NOTE — TELEPHONE ENCOUNTER
There was a PA received for [place], but there is not a current RX on file.    If you want PA please submit new RX, and resend this PA request back to the pool    If this requires a response please respond to the pool ( P [facility] MEDICATION PRE-AUTH).      Thank you please advise patient.

## 2024-02-01 RX ORDER — TIRZEPATIDE 2.5 MG/.5ML
2.5 INJECTION, SOLUTION SUBCUTANEOUS WEEKLY
Qty: 2 ML | Refills: 0 | Status: SHIPPED | OUTPATIENT
Start: 2024-02-01

## 2024-02-01 NOTE — TELEPHONE ENCOUNTER
Information regarding your request  Available without authorization.    If this requires a response please respond to the pool ( P MHC[facility] PSC MEDICATION PRE-AUTH).      Thank you please advise patient.

## 2024-02-01 NOTE — TELEPHONE ENCOUNTER
Submitted PA for Mounjaro 2.5MG/0.5ML pen-injectors  Via [facility] (Key: [id number]) STATUS: PENDING.    Follow up done daily; if no decision with in three days we will refax.  If another three days goes by with no decision will call the insurance for status.

## 2024-02-05 ENCOUNTER — OFFICE VISIT (OUTPATIENT)
Dept: ENDOCRINOLOGY | Age: 64
End: 2024-02-05

## 2024-02-05 DIAGNOSIS — E11.9 CONTROLLED TYPE 2 DIABETES MELLITUS WITHOUT COMPLICATION, WITHOUT LONG-TERM CURRENT USE OF INSULIN (HCC): Primary | ICD-10-CM

## 2024-02-05 NOTE — PROGRESS NOTES
Medical Nutrition Therapy for Diabetes  [facility] Endocrinology    Coby Prince  February 5, 2024    Patient Care Team:  Toney Basilio MD as PCP - Toy Barajas MD as Consulting Physician (Otolaryngology)    Reason for visit: 1. Controlled type 2 diabetes mellitus without complication, without long-term current use of insulin (Formerly [facility])    Follow up    ASSESSMENT/PLAN:   NUTRITION DIAGNOSIS    #1 Problem: Altered Nutrition-Related Laboratory Values (NC-2.2)  Related to: Endocrine/Diabetes   As Evidenced by: Elevated Plasma glucose and/or HgbA1c levels         #2 Problem: Inconsistent Carbohydrate Intake (NI 5.8.4)  Related to: Varied meal timing / incorrect carbohydrate counting  As Evidenced by: fluctuation in blood glucose levels / food recall    #3 Problem: Inadequate Carbohydrate Intake  Related to: food and nutrition knowledge deficit regarding controlling blood glucose  As evidenced by: food recall with less than 30 g carbohydrate per meal    NUTRITION INTERVENTION  Nutrition Prescription: 45  grams carbohydrate per meal with protein and non-starch vegetables  15 gram carbohydrate snacks     Diabetes Education/Counseling included:  Pt states she had a set back over the holidays she got the flu and continues to experience ear pain which is impacting her ability to work out, goals discussed of increasing exercise weekly, including more vegetables, and journal foods.  Reviewed high carb foods in diet   A1c has elevated to 6.6.  Activity/Exercise- encouraged daily 30 minutes of activity  Monitoring- declines information on testing BG levels  Medication Review- mounjaro ordered per MD.   Reviewed diet changes if starting GLP-1 per pt request.   Explained small efforts can promote improved health results  Benefit of eating protein with each meal/snack reviewed    Handouts Provided:  Sample Menu- Chain  Low carb snack ideas list- Chain  Strategies for eating carbohydrates-

## 2024-03-19 ENCOUNTER — TELEPHONE (OUTPATIENT)
Dept: ENDOCRINOLOGY | Age: 64
End: 2024-03-19

## 2024-03-19 DIAGNOSIS — E11.9 CONTROLLED TYPE 2 DIABETES MELLITUS WITHOUT COMPLICATION, WITHOUT LONG-TERM CURRENT USE OF INSULIN (HCC): Primary | ICD-10-CM

## 2024-03-19 RX ORDER — TIRZEPATIDE 5 MG/.5ML
5 INJECTION, SOLUTION SUBCUTANEOUS WEEKLY
Qty: 2 ML | Refills: 1 | Status: SHIPPED | OUTPATIENT
Start: 2024-03-19

## 2024-03-19 NOTE — TELEPHONE ENCOUNTER
Pt called in and said she needs a refill of     Tirzepatide (MOUNJARO) 2.5 MG/0.5ML SOPN SC injection       [facility] [id number] - [place], [place] - [address] - P [phone] - F [phone] [address], [facility] [address]  Phone: [phone]  Fax: [phone]     She said she handled the first month well and had no bad side effects

## 2024-04-12 DIAGNOSIS — E22.1 HYPERPROLACTINEMIA (HCC): ICD-10-CM

## 2024-04-12 RX ORDER — CABERGOLINE 0.5 MG/1
TABLET ORAL
Qty: 24 TABLET | Refills: 0 | Status: SHIPPED | OUTPATIENT
Start: 2024-04-12

## 2024-04-19 DIAGNOSIS — E22.1 HYPERPROLACTINEMIA (HCC): ICD-10-CM

## 2024-04-19 DIAGNOSIS — E11.9 CONTROLLED TYPE 2 DIABETES MELLITUS WITHOUT COMPLICATION, WITHOUT LONG-TERM CURRENT USE OF INSULIN (HCC): ICD-10-CM

## 2024-04-19 LAB
ALBUMIN SERPL-MCNC: 4.5 G/DL (ref 3.4–5)
ALBUMIN/GLOB SERPL: 1.6 {RATIO} (ref 1.1–2.2)
ALP SERPL-CCNC: 71 U/L (ref 40–129)
ALT SERPL-CCNC: 30 U/L (ref 10–40)
ANION GAP SERPL CALCULATED.3IONS-SCNC: 10 MMOL/L (ref 3–16)
AST SERPL-CCNC: 40 U/L (ref 15–37)
BILIRUB SERPL-MCNC: 0.6 MG/DL (ref 0–1)
BUN SERPL-MCNC: 8 MG/DL (ref 7–20)
CALCIUM SERPL-MCNC: 9.1 MG/DL (ref 8.3–10.6)
CHLORIDE SERPL-SCNC: 100 MMOL/L (ref 99–110)
CHOLEST SERPL-MCNC: 128 MG/DL (ref 0–199)
CO2 SERPL-SCNC: 29 MMOL/L (ref 21–32)
CREAT SERPL-MCNC: <0.5 MG/DL (ref 0.6–1.2)
CREAT UR-MCNC: 130.4 MG/DL (ref 28–259)
GFR SERPLBLD CREATININE-BSD FMLA CKD-EPI: >90 ML/MIN/{1.73_M2}
GLUCOSE SERPL-MCNC: 105 MG/DL (ref 70–99)
HDLC SERPL-MCNC: 40 MG/DL (ref 40–60)
LDLC SERPL CALC-MCNC: 68 MG/DL
MICROALBUMIN UR DL<=1MG/L-MCNC: <1.2 MG/DL
MICROALBUMIN/CREAT UR: NORMAL MG/G (ref 0–30)
POTASSIUM SERPL-SCNC: 4 MMOL/L (ref 3.5–5.1)
PROLACTIN SERPL IA-MCNC: 5.4 NG/ML
PROT SERPL-MCNC: 7.3 G/DL (ref 6.4–8.2)
SODIUM SERPL-SCNC: 139 MMOL/L (ref 136–145)
TRIGL SERPL-MCNC: 100 MG/DL (ref 0–150)
TSH SERPL DL<=0.005 MIU/L-ACNC: 3.33 UIU/ML (ref 0.27–4.2)
VLDLC SERPL CALC-MCNC: 20 MG/DL

## 2024-04-20 LAB
EST. AVERAGE GLUCOSE BLD GHB EST-MCNC: 105.4 MG/DL
HBA1C MFR BLD: 5.3 %

## 2024-04-22 ENCOUNTER — OFFICE VISIT (OUTPATIENT)
Dept: ENDOCRINOLOGY | Age: 64
End: 2024-04-22
Payer: COMMERCIAL

## 2024-04-22 VITALS
HEIGHT: 66 IN | RESPIRATION RATE: 16 BRPM | BODY MASS INDEX: 40.66 KG/M2 | SYSTOLIC BLOOD PRESSURE: 123 MMHG | HEART RATE: 75 BPM | DIASTOLIC BLOOD PRESSURE: 62 MMHG | WEIGHT: 253 LBS

## 2024-04-22 DIAGNOSIS — E78.2 MIXED HYPERLIPIDEMIA: ICD-10-CM

## 2024-04-22 DIAGNOSIS — E66.9 OBESITY (BMI 35.0-39.9 WITHOUT COMORBIDITY): ICD-10-CM

## 2024-04-22 DIAGNOSIS — D35.2 PITUITARY MICROADENOMA (HCC): ICD-10-CM

## 2024-04-22 DIAGNOSIS — E11.9 CONTROLLED TYPE 2 DIABETES MELLITUS WITHOUT COMPLICATION, WITHOUT LONG-TERM CURRENT USE OF INSULIN (HCC): Primary | ICD-10-CM

## 2024-04-22 PROCEDURE — 3044F HG A1C LEVEL LT 7.0%: CPT | Performed by: INTERNAL MEDICINE

## 2024-04-22 PROCEDURE — 99214 OFFICE O/P EST MOD 30 MIN: CPT | Performed by: INTERNAL MEDICINE

## 2024-04-22 RX ORDER — DULOXETIN HYDROCHLORIDE 30 MG/1
CAPSULE, DELAYED RELEASE ORAL
COMMUNITY
Start: 2024-04-16

## 2024-05-20 ENCOUNTER — OFFICE VISIT (OUTPATIENT)
Dept: ENDOCRINOLOGY | Age: 64
End: 2024-05-20
Payer: COMMERCIAL

## 2024-05-20 DIAGNOSIS — E11.9 CONTROLLED TYPE 2 DIABETES MELLITUS WITHOUT COMPLICATION, WITHOUT LONG-TERM CURRENT USE OF INSULIN (HCC): Primary | ICD-10-CM

## 2024-05-20 PROCEDURE — 97803 MED NUTRITION INDIV SUBSEQ: CPT

## 2024-05-20 PROCEDURE — 3044F HG A1C LEVEL LT 7.0%: CPT

## 2024-05-20 NOTE — PROGRESS NOTES
level  Journal food intakes, pt states goal will help her be more aware of food choices.   Take Medications per order         Patient Active Problem List   Diagnosis    Low back pain    Neck pain    Sialadenitis    Hearing loss    Allergic rhinitis, seasonal    Left breast mass    Pituitary microadenoma (HCC)    Obesity (BMI 35.0-39.9 without comorbidity)    Mixed hyperlipidemia    Postmenopausal    Prediabetes    Hyperprolactinemia (HCC)       Current Outpatient Medications   Medication Sig Dispense Refill    Tirzepatide (MOUNJARO) 7.5 MG/0.5ML SOPN SC injection Inject 0.5 mLs into the skin once a week (Patient taking differently: Inject 0.5 mLs into the skin once a week Pt taking 5 mg) 6 mL 1    DULoxetine (CYMBALTA) 30 MG extended release capsule       cabergoline (DOSTINEX) 0.5 MG tablet TAKE 1 TABLET BY MOUTH TWICE WEEKLY 24 tablet 0    PSYLLIUM PO Take 1 packet by mouth daily As needed      Lactobacillus Rhamnosus, GG, ( PROBIOTIC DIGESTIVE CARE) CAPS Take 1 capsule by mouth daily      Fluticasone Propionate (FLONASE NA) by Nasal route 2 times daily       baclofen (LIORESAL) 10 MG tablet Take 1 tablet by mouth 4 times daily Indications: 15 mg AM, 10mg afternoon, 15mg PM      CHOLINE PO Take 1 tablet by mouth daily      Cholecalciferol (VITAMIN D3) 2000 units TABS Take 1 tablet by mouth 2 times daily      loratadine (CLARITIN) 10 MG capsule Take by mouth      multivitamin (THERAGRAN) per tablet Take 1 tablet by mouth daily       No current facility-administered medications for this visit.       NUTRITION ASSESSMENT    Biochemical Data:  Reference range:  Normal <5.7%  Prediabetes: 5.7 - 6.4%  Diabetes: >6.4%  Glycemic control for adults with diabetes: <7.0 %     Lab Results   Component Value Date    LABA1C 5.3 04/19/2024     Lab Results   Component Value Date    .4 04/19/2024       Lab Results   Component Value Date    CHOL 128 04/19/2024    CHOL 131 01/19/2024    CHOL 154 06/23/2023     Lab Results

## 2024-08-09 DIAGNOSIS — E22.1 HYPERPROLACTINEMIA (HCC): ICD-10-CM

## 2024-08-09 RX ORDER — CABERGOLINE 0.5 MG/1
TABLET ORAL
Qty: 24 TABLET | Refills: 0 | Status: SHIPPED | OUTPATIENT
Start: 2024-08-09

## 2024-08-26 ENCOUNTER — OFFICE VISIT (OUTPATIENT)
Dept: ENDOCRINOLOGY | Age: 64
End: 2024-08-26
Payer: COMMERCIAL

## 2024-08-26 DIAGNOSIS — E11.9 CONTROLLED TYPE 2 DIABETES MELLITUS WITHOUT COMPLICATION, WITHOUT LONG-TERM CURRENT USE OF INSULIN (HCC): Primary | ICD-10-CM

## 2024-08-26 PROCEDURE — 3044F HG A1C LEVEL LT 7.0%: CPT

## 2024-08-26 PROCEDURE — 97803 MED NUTRITION INDIV SUBSEQ: CPT

## 2024-08-26 NOTE — PROGRESS NOTES
TRIG 137 01/19/2024    TRIG 122 06/23/2023     Lab Results   Component Value Date    HDL 40 04/19/2024    HDL 33 (L) 01/19/2024    HDL 39 (L) 06/23/2023     No components found for: \"LDLCALC\", \"LDLCHOLESTEROL\"    Lab Results   Component Value Date    VLDL 20 04/19/2024    VLDL 27 01/19/2024    VLDL 24 06/23/2023       No results found for: \"CHOLHDLRATIO\"    No results found for: \"WBC\", \"HGB\", \"HCT\", \"MCV\", \"PLT\"    Lab Results   Component Value Date    CREATININE <0.5 (L) 04/19/2024    BUN 8 04/19/2024     04/19/2024    K 4.0 04/19/2024     04/19/2024    CO2 29 04/19/2024       Anthropometric Measurements:  Wt:   Wt Readings from Last 3 Encounters:   04/22/24 114.8 kg (253 lb)   01/22/24 120.9 kg (266 lb 9.6 oz)   06/26/23 117.9 kg (260 lb)      BMI:   BMI Readings from Last 3 Encounters:   04/22/24 41.46 kg/m²   01/22/24 44.36 kg/m²   06/26/23 43.27 kg/m²     Patient's stated goal weight: wants to lose weight  7% Weight loss goal weight: n/a    Food and Nutrition History:   Nutrition Awareness/Previous DSMES: yes   Number of people in household: 4  Frequency of Meals Eaten away from home:2-3 times per week  Food Availability Problems  Within the past 12 months, have you worried that your food would run out before you got money to buy more?No  Within the past 12 months, has the food you bought not lasted till the end of the month and you didn't have money to get more? No    Beverage consumption: green tea, 12-16 glasses water.almond milk,    Alcohol consumption: no.     Usual Food consumption:     7am; 2 eggs w/ half english muffin w/ olive oil. W/ green tea.    Lunch: roll, salad with protein.    Snacks: nuts, sunflower seeds, almonds,   5-530: carry out, will get salads and fish or chicken.   10pm: nuts/seeds. snack.       Sleep: poor rt neuropathy,     Monitoring:   Has BG meter: No, reviewed testing in morning      Diabetes Medications: Yes  Mounjaro tolerated    Physical Activity History:    Physical activity: did aqua aerobics in past  Frequency of activity: not doing currently  Duration of activity: n/a  Obstacles to activity: motivation, lifestyle challenges. Encouraged to restart.     Barriers:   Stage of change    Follow Up Plan: 3 months    Referring Provider:  Radha Cannon MD      Time spent with patient: 30 minutes  12:00-12:30 pm    Carol Hightower RD

## 2024-09-11 ENCOUNTER — TELEPHONE (OUTPATIENT)
Dept: ENDOCRINOLOGY | Age: 64
End: 2024-09-11

## 2024-09-11 DIAGNOSIS — E11.9 CONTROLLED TYPE 2 DIABETES MELLITUS WITHOUT COMPLICATION, WITHOUT LONG-TERM CURRENT USE OF INSULIN (HCC): Primary | ICD-10-CM

## 2024-09-11 RX ORDER — LANCING DEVICE/LANCETS
KIT MISCELLANEOUS
Qty: 1 EACH | Refills: 0 | Status: SHIPPED | OUTPATIENT
Start: 2024-09-11

## 2024-09-11 RX ORDER — BLOOD-GLUCOSE METER
EACH MISCELLANEOUS
Qty: 1 KIT | Refills: 0 | Status: SHIPPED | OUTPATIENT
Start: 2024-09-11

## 2024-09-11 RX ORDER — LANCETS 33 GAUGE
EACH MISCELLANEOUS
Qty: 100 EACH | Refills: 5 | Status: SHIPPED | OUTPATIENT
Start: 2024-09-11

## 2024-09-11 RX ORDER — BLOOD SUGAR DIAGNOSTIC
STRIP MISCELLANEOUS
Qty: 100 EACH | Refills: 5 | Status: SHIPPED | OUTPATIENT
Start: 2024-09-11

## 2024-09-13 DIAGNOSIS — E78.2 MIXED HYPERLIPIDEMIA: ICD-10-CM

## 2024-09-13 DIAGNOSIS — E11.9 CONTROLLED TYPE 2 DIABETES MELLITUS WITHOUT COMPLICATION, WITHOUT LONG-TERM CURRENT USE OF INSULIN (HCC): ICD-10-CM

## 2024-09-13 DIAGNOSIS — D35.2 PITUITARY MICROADENOMA (HCC): ICD-10-CM

## 2024-09-13 LAB
ALBUMIN SERPL-MCNC: 4.7 G/DL (ref 3.4–5)
ALBUMIN/GLOB SERPL: 1.7 {RATIO} (ref 1.1–2.2)
ALP SERPL-CCNC: 74 U/L (ref 40–129)
ALT SERPL-CCNC: 28 U/L (ref 10–40)
ANION GAP SERPL CALCULATED.3IONS-SCNC: 11 MMOL/L (ref 3–16)
AST SERPL-CCNC: 35 U/L (ref 15–37)
BILIRUB SERPL-MCNC: 0.7 MG/DL (ref 0–1)
BUN SERPL-MCNC: 10 MG/DL (ref 7–20)
CALCIUM SERPL-MCNC: 9.7 MG/DL (ref 8.3–10.6)
CHLORIDE SERPL-SCNC: 102 MMOL/L (ref 99–110)
CHOLEST SERPL-MCNC: 136 MG/DL (ref 0–199)
CO2 SERPL-SCNC: 28 MMOL/L (ref 21–32)
CREAT SERPL-MCNC: 0.7 MG/DL (ref 0.6–1.2)
GFR SERPLBLD CREATININE-BSD FMLA CKD-EPI: >90 ML/MIN/{1.73_M2}
GLUCOSE SERPL-MCNC: 87 MG/DL (ref 70–99)
HDLC SERPL-MCNC: 40 MG/DL (ref 40–60)
LDLC SERPL CALC-MCNC: 80 MG/DL
POTASSIUM SERPL-SCNC: 4.1 MMOL/L (ref 3.5–5.1)
PROT SERPL-MCNC: 7.4 G/DL (ref 6.4–8.2)
SODIUM SERPL-SCNC: 141 MMOL/L (ref 136–145)
TRIGL SERPL-MCNC: 79 MG/DL (ref 0–150)
TSH SERPL DL<=0.005 MIU/L-ACNC: 3.21 UIU/ML (ref 0.27–4.2)
VLDLC SERPL CALC-MCNC: 16 MG/DL

## 2024-09-14 LAB
EST. AVERAGE GLUCOSE BLD GHB EST-MCNC: 105.4 MG/DL
HBA1C MFR BLD: 5.3 %

## 2024-09-23 ENCOUNTER — OFFICE VISIT (OUTPATIENT)
Dept: ENDOCRINOLOGY | Age: 64
End: 2024-09-23
Payer: COMMERCIAL

## 2024-09-23 VITALS
BODY MASS INDEX: 40.5 KG/M2 | WEIGHT: 252 LBS | DIASTOLIC BLOOD PRESSURE: 73 MMHG | TEMPERATURE: 98 F | RESPIRATION RATE: 14 BRPM | HEIGHT: 66 IN | SYSTOLIC BLOOD PRESSURE: 132 MMHG | HEART RATE: 69 BPM

## 2024-09-23 DIAGNOSIS — E22.1 HYPERPROLACTINEMIA (HCC): ICD-10-CM

## 2024-09-23 DIAGNOSIS — E11.9 CONTROLLED TYPE 2 DIABETES MELLITUS WITHOUT COMPLICATION, WITHOUT LONG-TERM CURRENT USE OF INSULIN (HCC): Primary | ICD-10-CM

## 2024-09-23 DIAGNOSIS — E78.2 MIXED HYPERLIPIDEMIA: ICD-10-CM

## 2024-09-23 DIAGNOSIS — D35.2 PITUITARY MICROADENOMA (HCC): ICD-10-CM

## 2024-09-23 DIAGNOSIS — E66.9 OBESITY (BMI 35.0-39.9 WITHOUT COMORBIDITY): ICD-10-CM

## 2024-09-23 PROBLEM — R73.03 PREDIABETES: Status: RESOLVED | Noted: 2021-03-03 | Resolved: 2024-09-23

## 2024-09-23 PROCEDURE — 99214 OFFICE O/P EST MOD 30 MIN: CPT | Performed by: INTERNAL MEDICINE

## 2024-09-23 PROCEDURE — 3044F HG A1C LEVEL LT 7.0%: CPT | Performed by: INTERNAL MEDICINE

## 2024-09-23 RX ORDER — SUMATRIPTAN 100 MG/1
TABLET, FILM COATED ORAL
COMMUNITY
Start: 2024-09-04

## 2024-09-23 RX ORDER — TIRZEPATIDE 10 MG/.5ML
10 INJECTION, SOLUTION SUBCUTANEOUS WEEKLY
Qty: 2 ML | Refills: 1 | Status: CANCELLED | OUTPATIENT
Start: 2024-09-23

## 2024-09-23 RX ORDER — TIRZEPATIDE 10 MG/.5ML
10 INJECTION, SOLUTION SUBCUTANEOUS WEEKLY
Qty: 4 ADJUSTABLE DOSE PRE-FILLED PEN SYRINGE | Refills: 4 | Status: SHIPPED | OUTPATIENT
Start: 2024-09-23

## 2024-11-07 DIAGNOSIS — E22.1 HYPERPROLACTINEMIA (HCC): ICD-10-CM

## 2024-11-07 RX ORDER — CABERGOLINE 0.5 MG/1
TABLET ORAL
Qty: 24 TABLET | Refills: 0 | Status: SHIPPED | OUTPATIENT
Start: 2024-11-07

## 2025-03-03 ENCOUNTER — TELEPHONE (OUTPATIENT)
Dept: ENDOCRINOLOGY | Age: 65
End: 2025-03-03

## 2025-03-03 DIAGNOSIS — E11.9 CONTROLLED TYPE 2 DIABETES MELLITUS WITHOUT COMPLICATION, WITHOUT LONG-TERM CURRENT USE OF INSULIN (HCC): Primary | ICD-10-CM

## 2025-03-03 NOTE — TELEPHONE ENCOUNTER
Pt asking to up her medication below       Tirzepatide (MOUNJARO) 10 MG/0.5ML SOPN SC injection     [id number] - [place], [place] - [address] - P [phone] - F [phone]  [address] [facility] [id number]  Phone: [phone]  Fax: [phone]

## 2025-03-03 NOTE — TELEPHONE ENCOUNTER
Medication:   Requested Prescriptions     Pending Prescriptions Disp Refills    Tirzepatide 10 MG/0.5ML SOAJ 2 mL 5     Sig: Inject 10 mg into the skin every 7 days       Last Filled:      Patient Phone Number: [phone] (home)     Last appt: 9/23/24  Next appt: 7/7/25    Last Labs DM:   Lab Results   Component Value Date/Time    LABA1C 5.3 09/13/2024 02:16 PM

## 2025-05-11 DIAGNOSIS — E22.1 HYPERPROLACTINEMIA: ICD-10-CM

## 2025-05-12 RX ORDER — CABERGOLINE 0.5 MG/1
TABLET ORAL
Qty: 24 TABLET | Refills: 0 | Status: SHIPPED | OUTPATIENT
Start: 2025-05-12

## 2025-07-14 DIAGNOSIS — E11.9 CONTROLLED TYPE 2 DIABETES MELLITUS WITHOUT COMPLICATION, WITHOUT LONG-TERM CURRENT USE OF INSULIN (HCC): ICD-10-CM

## 2025-07-14 RX ORDER — TIRZEPATIDE 12.5 MG/.5ML
0.5 INJECTION, SOLUTION SUBCUTANEOUS WEEKLY
Qty: 2 ML | Refills: 0 | Status: SHIPPED | OUTPATIENT
Start: 2025-07-14

## 2025-07-18 DIAGNOSIS — E22.1 HYPERPROLACTINEMIA: ICD-10-CM

## 2025-07-18 DIAGNOSIS — E11.9 CONTROLLED TYPE 2 DIABETES MELLITUS WITHOUT COMPLICATION, WITHOUT LONG-TERM CURRENT USE OF INSULIN (HCC): ICD-10-CM

## 2025-07-18 DIAGNOSIS — D35.2 PITUITARY MICROADENOMA (HCC): ICD-10-CM

## 2025-07-18 DIAGNOSIS — E78.2 MIXED HYPERLIPIDEMIA: ICD-10-CM

## 2025-07-18 LAB
ALBUMIN SERPL-MCNC: 4.6 G/DL (ref 3.4–5)
ALBUMIN/GLOB SERPL: 1.8 {RATIO} (ref 1.1–2.2)
ALP SERPL-CCNC: 71 U/L (ref 40–129)
ALT SERPL-CCNC: 20 U/L (ref 10–40)
ANION GAP SERPL CALCULATED.3IONS-SCNC: 11 MMOL/L (ref 3–16)
AST SERPL-CCNC: 20 U/L (ref 15–37)
BILIRUB SERPL-MCNC: 0.5 MG/DL (ref 0–1)
BUN SERPL-MCNC: 11 MG/DL (ref 7–20)
CALCIUM SERPL-MCNC: 9.4 MG/DL (ref 8.3–10.6)
CHLORIDE SERPL-SCNC: 104 MMOL/L (ref 99–110)
CHOLEST SERPL-MCNC: 118 MG/DL (ref 0–199)
CO2 SERPL-SCNC: 27 MMOL/L (ref 21–32)
CREAT SERPL-MCNC: 0.7 MG/DL (ref 0.6–1.2)
EST. AVERAGE GLUCOSE BLD GHB EST-MCNC: 99.7 MG/DL
GFR SERPLBLD CREATININE-BSD FMLA CKD-EPI: >90 ML/MIN/{1.73_M2}
GLUCOSE SERPL-MCNC: 109 MG/DL (ref 70–99)
HBA1C MFR BLD: 5.1 %
HDLC SERPL-MCNC: 40 MG/DL (ref 40–60)
LDLC SERPL CALC-MCNC: 65 MG/DL
POTASSIUM SERPL-SCNC: 4.1 MMOL/L (ref 3.5–5.1)
PROLACTIN SERPL IA-MCNC: 6.8 NG/ML
PROT SERPL-MCNC: 7.2 G/DL (ref 6.4–8.2)
SODIUM SERPL-SCNC: 142 MMOL/L (ref 136–145)
TRIGL SERPL-MCNC: 67 MG/DL (ref 0–150)
TSH SERPL DL<=0.005 MIU/L-ACNC: 3.18 UIU/ML (ref 0.27–4.2)
VLDLC SERPL CALC-MCNC: 13 MG/DL

## 2025-07-21 ENCOUNTER — OFFICE VISIT (OUTPATIENT)
Dept: ENDOCRINOLOGY | Age: 65
End: 2025-07-21
Payer: COMMERCIAL

## 2025-07-21 VITALS
SYSTOLIC BLOOD PRESSURE: 109 MMHG | OXYGEN SATURATION: 98 % | BODY MASS INDEX: 39.57 KG/M2 | HEIGHT: 66 IN | WEIGHT: 246.2 LBS | TEMPERATURE: 98 F | DIASTOLIC BLOOD PRESSURE: 68 MMHG | RESPIRATION RATE: 16 BRPM | HEART RATE: 77 BPM

## 2025-07-21 DIAGNOSIS — E22.1 HYPERPROLACTINEMIA: ICD-10-CM

## 2025-07-21 DIAGNOSIS — E78.2 MIXED HYPERLIPIDEMIA: ICD-10-CM

## 2025-07-21 DIAGNOSIS — E11.9 CONTROLLED TYPE 2 DIABETES MELLITUS WITHOUT COMPLICATION, WITHOUT LONG-TERM CURRENT USE OF INSULIN (HCC): Primary | ICD-10-CM

## 2025-07-21 PROCEDURE — 3044F HG A1C LEVEL LT 7.0%: CPT | Performed by: INTERNAL MEDICINE

## 2025-07-21 PROCEDURE — 99214 OFFICE O/P EST MOD 30 MIN: CPT | Performed by: INTERNAL MEDICINE

## 2025-07-21 RX ORDER — OMEPRAZOLE 20 MG/1
20 TABLET, DELAYED RELEASE ORAL 2 TIMES DAILY
COMMUNITY

## 2025-07-21 RX ORDER — CABERGOLINE 0.5 MG/1
TABLET ORAL
Qty: 24 TABLET | Refills: 1 | Status: SHIPPED | OUTPATIENT
Start: 2025-07-21

## 2025-07-21 NOTE — PROGRESS NOTES
SUBJECTIVE:  Coby Prince is a 65 y.o. female  seen for hyperprolactinemia, pituitary microadenoma which has resolved on imaging, prediabetes and hyperlipidemia pt was diagnosed with Pituitary microadenoma in 1995 at the time she was having issues with PCOS --she was having headaches and galactorrhea at the time she was started on Parlodel which she took for 3 years and she had repeat MRI which didn't show any pituitary microadenoma . She was later switched to Dostinex which she has continued up until now which has been prescribed by her ob/gyn   --In 2014 she started having neurological complaints like numbness and tingling in her hands , diagnosed with CTS bilateraly during her workup she had MRI brain done which again showed pituitary ADENOMA  She also has DJD in cervical spine   According to the patient she was size 4 up until college and then in her 30's she started with ovarian issues and gained a lot of wt , Acne  since age 41 she continued to have menstrual periods until she had her hysterectomy in October 2018 at the age of 58   She had rt knee replacement in sept 2016  -- June 2018 she underwent uterine fibroid removal as well as uterine ablation in June 2018   --She had a complete hysterectomy on oct  2018    ---CTS surgery in dec 2019 Rt hand ---symptoms improved   ---DEXA scan was normal in July 2019   She was diagnosed with pneumonia and was on multiple antibiotics, diagnosed with liver issues as well as a benign pancreatic cyst and has already seen specialist for all of these problems at [facility] all those charts notes reviewed with the patient in detail.    INTERIM     ---no SE from Mounjaro    She has been taking her Dostinex regularly denies any nipple discharge denies any headache or blurred vision.  She underwent MRI of her spine and brain, pituitary imaging was normal in 2024       Past Medical History:   Diagnosis Date    Arthritis     osteoarthritis    Cellulitis 10/2018    abdomen    Cyst